# Patient Record
Sex: FEMALE | Race: WHITE | Employment: FULL TIME | URBAN - METROPOLITAN AREA
[De-identification: names, ages, dates, MRNs, and addresses within clinical notes are randomized per-mention and may not be internally consistent; named-entity substitution may affect disease eponyms.]

---

## 2018-05-25 ENCOUNTER — HOSPITAL ENCOUNTER (OUTPATIENT)
Dept: ULTRASOUND IMAGING | Age: 28
Discharge: HOME | End: 2018-05-25
Attending: NURSE PRACTITIONER
Payer: COMMERCIAL

## 2018-05-25 DIAGNOSIS — Z3A.09 9 WEEKS GESTATION OF PREGNANCY: ICD-10-CM

## 2018-05-25 PROCEDURE — 76801 OB US < 14 WKS SINGLE FETUS: CPT | Performed by: NURSE PRACTITIONER

## 2018-05-25 PROCEDURE — 76817 TRANSVAGINAL US OBSTETRIC: CPT | Performed by: NURSE PRACTITIONER

## 2018-05-29 ENCOUNTER — LAB ENCOUNTER (OUTPATIENT)
Dept: LAB | Age: 28
End: 2018-05-29
Attending: NURSE PRACTITIONER
Payer: COMMERCIAL

## 2018-05-29 DIAGNOSIS — Z34.00 PRENATAL CARE, FIRST PREGNANCY: Primary | ICD-10-CM

## 2018-05-29 PROCEDURE — 85025 COMPLETE CBC W/AUTO DIFF WBC: CPT

## 2018-05-29 PROCEDURE — 36415 COLL VENOUS BLD VENIPUNCTURE: CPT

## 2018-05-29 PROCEDURE — 86762 RUBELLA ANTIBODY: CPT

## 2018-05-29 PROCEDURE — 87340 HEPATITIS B SURFACE AG IA: CPT

## 2018-05-29 PROCEDURE — 86900 BLOOD TYPING SEROLOGIC ABO: CPT

## 2018-05-29 PROCEDURE — 86850 RBC ANTIBODY SCREEN: CPT

## 2018-05-29 PROCEDURE — 87389 HIV-1 AG W/HIV-1&-2 AB AG IA: CPT

## 2018-05-29 PROCEDURE — 86901 BLOOD TYPING SEROLOGIC RH(D): CPT

## 2018-05-29 PROCEDURE — 86780 TREPONEMA PALLIDUM: CPT

## 2018-10-22 PROBLEM — Z34.00 SUPERVISION OF NORMAL FIRST PREGNANCY: Status: ACTIVE | Noted: 2018-10-22

## 2018-11-30 ENCOUNTER — APPOINTMENT (OUTPATIENT)
Dept: LAB | Age: 28
End: 2018-11-30
Attending: OBSTETRICS & GYNECOLOGY
Payer: COMMERCIAL

## 2018-11-30 DIAGNOSIS — Z34.03 ENCOUNTER FOR SUPERVISION OF NORMAL FIRST PREGNANCY IN THIRD TRIMESTER: ICD-10-CM

## 2018-11-30 PROCEDURE — 36415 COLL VENOUS BLD VENIPUNCTURE: CPT

## 2018-11-30 PROCEDURE — 87389 HIV-1 AG W/HIV-1&-2 AB AG IA: CPT

## 2018-12-03 PROCEDURE — 87081 CULTURE SCREEN ONLY: CPT | Performed by: OBSTETRICS & GYNECOLOGY

## 2018-12-03 PROCEDURE — 87653 STREP B DNA AMP PROBE: CPT | Performed by: OBSTETRICS & GYNECOLOGY

## 2018-12-21 ENCOUNTER — HOSPITAL ENCOUNTER (INPATIENT)
Facility: HOSPITAL | Age: 28
LOS: 3 days | Discharge: HOME | End: 2018-12-24
Attending: OBSTETRICS & GYNECOLOGY | Admitting: OBSTETRICS & GYNECOLOGY
Payer: COMMERCIAL

## 2018-12-21 PROBLEM — Z34.90 NORMAL PREGNANCY: Status: ACTIVE | Noted: 2018-12-21

## 2018-12-21 PROCEDURE — 86900 BLOOD TYPING SEROLOGIC ABO: CPT | Performed by: OBSTETRICS & GYNECOLOGY

## 2018-12-21 PROCEDURE — 86901 BLOOD TYPING SEROLOGIC RH(D): CPT | Performed by: OBSTETRICS & GYNECOLOGY

## 2018-12-21 PROCEDURE — 86780 TREPONEMA PALLIDUM: CPT | Performed by: OBSTETRICS & GYNECOLOGY

## 2018-12-21 PROCEDURE — 81002 URINALYSIS NONAUTO W/O SCOPE: CPT

## 2018-12-21 PROCEDURE — 86850 RBC ANTIBODY SCREEN: CPT | Performed by: OBSTETRICS & GYNECOLOGY

## 2018-12-21 PROCEDURE — 85027 COMPLETE CBC AUTOMATED: CPT | Performed by: OBSTETRICS & GYNECOLOGY

## 2018-12-21 PROCEDURE — 99204 OFFICE O/P NEW MOD 45 MIN: CPT

## 2018-12-21 RX ORDER — DEXTROSE, SODIUM CHLORIDE, SODIUM LACTATE, POTASSIUM CHLORIDE, AND CALCIUM CHLORIDE 5; .6; .31; .03; .02 G/100ML; G/100ML; G/100ML; G/100ML; G/100ML
INJECTION, SOLUTION INTRAVENOUS AS NEEDED
Status: DISCONTINUED | OUTPATIENT
Start: 2018-12-21 | End: 2018-12-22 | Stop reason: HOSPADM

## 2018-12-21 RX ORDER — EPHEDRINE SULFATE/0.9% NACL/PF 25 MG/5 ML
5 SYRINGE (ML) INTRAVENOUS AS NEEDED
Status: DISCONTINUED | OUTPATIENT
Start: 2018-12-21 | End: 2018-12-22

## 2018-12-21 RX ORDER — SODIUM CHLORIDE, SODIUM LACTATE, POTASSIUM CHLORIDE, CALCIUM CHLORIDE 600; 310; 30; 20 MG/100ML; MG/100ML; MG/100ML; MG/100ML
INJECTION, SOLUTION INTRAVENOUS CONTINUOUS
Status: DISCONTINUED | OUTPATIENT
Start: 2018-12-21 | End: 2018-12-22 | Stop reason: HOSPADM

## 2018-12-21 RX ORDER — IBUPROFEN 600 MG/1
600 TABLET ORAL ONCE AS NEEDED
Status: DISCONTINUED | OUTPATIENT
Start: 2018-12-21 | End: 2018-12-22 | Stop reason: HOSPADM

## 2018-12-21 RX ORDER — AMMONIA INHALANTS 0.04 G/.3ML
0.3 INHALANT RESPIRATORY (INHALATION) AS NEEDED
Status: DISCONTINUED | OUTPATIENT
Start: 2018-12-21 | End: 2018-12-22 | Stop reason: HOSPADM

## 2018-12-21 RX ORDER — NALBUPHINE HCL 10 MG/ML
2.5 AMPUL (ML) INJECTION
Status: DISCONTINUED | OUTPATIENT
Start: 2018-12-21 | End: 2018-12-22

## 2018-12-21 RX ORDER — TERBUTALINE SULFATE 1 MG/ML
0.25 INJECTION, SOLUTION SUBCUTANEOUS AS NEEDED
Status: DISCONTINUED | OUTPATIENT
Start: 2018-12-21 | End: 2018-12-22 | Stop reason: HOSPADM

## 2018-12-21 RX ORDER — TRISODIUM CITRATE DIHYDRATE AND CITRIC ACID MONOHYDRATE 500; 334 MG/5ML; MG/5ML
30 SOLUTION ORAL AS NEEDED
Status: DISCONTINUED | OUTPATIENT
Start: 2018-12-21 | End: 2018-12-22 | Stop reason: HOSPADM

## 2018-12-21 NOTE — PROGRESS NOTES
Patient presents to Triage with concerns of labor stating her pain at 7 during contractions. Contractions started last nite at 8 pm and continued thru the nite she was able to sleep.  She decided to come to the hospital because contractions became closer to

## 2018-12-22 PROBLEM — Z37.9 NORMAL LABOR: Status: ACTIVE | Noted: 2018-12-22

## 2018-12-22 RX ORDER — ACETAMINOPHEN 325 MG/1
650 TABLET ORAL EVERY 6 HOURS PRN
Status: DISCONTINUED | OUTPATIENT
Start: 2018-12-22 | End: 2018-12-24

## 2018-12-22 RX ORDER — DOCUSATE SODIUM 100 MG/1
100 CAPSULE, LIQUID FILLED ORAL
Status: DISCONTINUED | OUTPATIENT
Start: 2018-12-22 | End: 2018-12-24

## 2018-12-22 RX ORDER — SIMETHICONE 80 MG
80 TABLET,CHEWABLE ORAL 3 TIMES DAILY PRN
Status: DISCONTINUED | OUTPATIENT
Start: 2018-12-22 | End: 2018-12-24

## 2018-12-22 RX ORDER — IBUPROFEN 600 MG/1
600 TABLET ORAL EVERY 6 HOURS
Status: DISCONTINUED | OUTPATIENT
Start: 2018-12-22 | End: 2018-12-24

## 2018-12-22 RX ORDER — ZOLPIDEM TARTRATE 5 MG/1
5 TABLET ORAL NIGHTLY PRN
Status: DISCONTINUED | OUTPATIENT
Start: 2018-12-22 | End: 2018-12-24

## 2018-12-22 RX ORDER — BISACODYL 10 MG
10 SUPPOSITORY, RECTAL RECTAL ONCE AS NEEDED
Status: ACTIVE | OUTPATIENT
Start: 2018-12-22 | End: 2018-12-22

## 2018-12-22 NOTE — PROGRESS NOTES
NURSING ADMISSION NOTE      Patient admitted via Wheelchair  Oriented to room. Safety precautions initiated. Bed in low position. Call light in reach. Report received from Ned Yung.

## 2018-12-22 NOTE — PROGRESS NOTES
Dr Farrell Covert called and informed of pts. SVE since walking for an hour. SVE 3/90/ -2 mid position . Feeling contractions more intense than admission. Large amount of bloody show noted with exam.  Evi every 3 mins. Palpate, mild.

## 2018-12-22 NOTE — H&P
Jiráschristin 1205 Patient Status:  Inpatient    1990 MRN [id number]   Location [address] [facility] Attending Dayna Lafleur MD   [facility] Day # 1 PCP Lucien Gonzalez, APRN     Subjective:  Evy Lawton discussed in detail. All questions answered, all appropriate consents will be signed at the Hospital. Admission is planned for today. Augmentation: IV Pitocin augmentation.

## 2018-12-22 NOTE — L&D DELIVERY NOTE
Vaginal Delivery Note          [address] [facility] Patient Status:  Inpatient    1990 MRN [id number]   Location 1818 [facility] Attending Eb Burr MD   [facility] Day # 1 PCP correct. Complications:  None    Mother and infant in good condition.

## 2018-12-23 PROCEDURE — 85025 COMPLETE CBC W/AUTO DIFF WBC: CPT | Performed by: OBSTETRICS & GYNECOLOGY

## 2018-12-23 NOTE — PROGRESS NOTES
[facility]  Post-Partum Progress Note    May End Patient Status:  Inpatient    1990 MRN [id number]   [facility] 2SW-J Attending Elliot Rocha MD   [facility] Day # 2 PCP Rosa More, APRN     SUBJECTIVE:    Postpartum

## 2018-12-24 VITALS
TEMPERATURE: 98 F | OXYGEN SATURATION: 90 % | BODY MASS INDEX: 28.79 KG/M2 | RESPIRATION RATE: 18 BRPM | HEART RATE: 66 BPM | WEIGHT: 190 LBS | SYSTOLIC BLOOD PRESSURE: 121 MMHG | HEIGHT: 68 IN | DIASTOLIC BLOOD PRESSURE: 66 MMHG

## 2018-12-24 PROBLEM — Z34.90 NORMAL PREGNANCY: Status: RESOLVED | Noted: 2018-12-21 | Resolved: 2018-12-24

## 2018-12-24 PROBLEM — Z37.9 NORMAL LABOR: Status: RESOLVED | Noted: 2018-12-22 | Resolved: 2018-12-24

## 2018-12-24 NOTE — DISCHARGE SUMMARY
[facility]  Discharge Summary    [address] [facility] Patient Status:  Inpatient    1990 MRN [id number]   [facility] 2SW-J Attending Naida Monae MD   [facility] Day # 3 PCP JORDEN Argueta         [facility]: Estimated Date of Delive

## 2018-12-24 NOTE — PROGRESS NOTES
[facility]  Post-Partum Progress Note    Christiano Mcclendon Patient Status:  Inpatient    1990 MRN [id number]   [facility] 2SW-J Attending Arturo Del Toro MD   [facility] Day # 3 PCP Aakash Roblero, APRN     SUBJECTIVE:    Postpartum

## 2018-12-27 ENCOUNTER — TELEPHONE (OUTPATIENT)
Dept: OBGYN UNIT | Facility: HOSPITAL | Age: 28
End: 2018-12-27

## 2018-12-28 ENCOUNTER — TELEPHONE (OUTPATIENT)
Dept: OBGYN UNIT | Facility: HOSPITAL | Age: 28
End: 2018-12-28

## 2018-12-28 NOTE — PROGRESS NOTES
Outgoing cradle call placed. No answer. Left general voicemail message #2 attempt/VM left, CC attempts are complete.  Will send [facility] letter

## 2019-01-03 ENCOUNTER — NURSE ONLY (OUTPATIENT)
Dept: LACTATION | Facility: HOSPITAL | Age: 29
End: 2019-01-03
Attending: OBSTETRICS & GYNECOLOGY
Payer: COMMERCIAL

## 2019-01-03 DIAGNOSIS — O92.29 SORE NIPPLES DUE TO LACTATION: Primary | ICD-10-CM

## 2019-01-03 PROCEDURE — 99213 OFFICE O/P EST LOW 20 MIN: CPT

## 2019-01-03 NOTE — PROGRESS NOTES
LACTATION NOTE - MOTHER      Evaluation Type: Outpatient Initial    Problems identified  Problems identified: Knowledge deficit; Nipple trauma; Nipple pain;Milk supply WNL    Maternal history  Other/comment: (States that her nipples have scabbed and bled, ha

## 2019-02-01 PROCEDURE — 87624 HPV HI-RISK TYP POOLED RSLT: CPT | Performed by: OBSTETRICS & GYNECOLOGY

## 2019-02-01 PROCEDURE — 88175 CYTOPATH C/V AUTO FLUID REDO: CPT | Performed by: OBSTETRICS & GYNECOLOGY

## 2020-07-21 ENCOUNTER — TELEPHONE (OUTPATIENT)
Dept: OBGYN CLINIC | Facility: CLINIC | Age: 30
End: 2020-07-21

## 2020-08-11 ENCOUNTER — OFFICE VISIT (OUTPATIENT)
Dept: OBGYN CLINIC | Facility: CLINIC | Age: 30
End: 2020-08-11
Payer: COMMERCIAL

## 2020-08-11 ENCOUNTER — ULTRASOUND ENCOUNTER (OUTPATIENT)
Dept: OBGYN CLINIC | Facility: CLINIC | Age: 30
End: 2020-08-11
Payer: COMMERCIAL

## 2020-08-11 VITALS
DIASTOLIC BLOOD PRESSURE: 68 MMHG | SYSTOLIC BLOOD PRESSURE: 116 MMHG | HEIGHT: 67 IN | BODY MASS INDEX: 27.81 KG/M2 | HEART RATE: 65 BPM | WEIGHT: 177.19 LBS

## 2020-08-11 DIAGNOSIS — N91.1 SECONDARY AMENORRHEA: Primary | ICD-10-CM

## 2020-08-11 DIAGNOSIS — Z32.01 PREGNANCY EXAMINATION OR TEST, POSITIVE RESULT: ICD-10-CM

## 2020-08-11 PROCEDURE — 3074F SYST BP LT 130 MM HG: CPT | Performed by: OBSTETRICS & GYNECOLOGY

## 2020-08-11 PROCEDURE — 3078F DIAST BP <80 MM HG: CPT | Performed by: OBSTETRICS & GYNECOLOGY

## 2020-08-11 PROCEDURE — 3008F BODY MASS INDEX DOCD: CPT | Performed by: OBSTETRICS & GYNECOLOGY

## 2020-08-11 PROCEDURE — 76856 US EXAM PELVIC COMPLETE: CPT | Performed by: OBSTETRICS & GYNECOLOGY

## 2020-08-11 PROCEDURE — 99203 OFFICE O/P NEW LOW 30 MIN: CPT | Performed by: OBSTETRICS & GYNECOLOGY

## 2020-08-11 NOTE — PATIENT INSTRUCTIONS
Medications Safe in Pregnancy  The following over-the-counter medications may be taken safely after 12 weeks gestation by any patient who is pregnant. Please follow the instructions on the package for adult dosage.   If you experience any symptoms prior to screening:  - Performed at 13 weeks and includes blood test and ultrasound  - Screens for Trisomy 13, 18 and 21 (Down Syndrome)   - You will need to schedule an appointment with the Maternal Fetal Medicine office  - Therefore, you will need time to make ap code: [id number]  Diagnosis code: Cystic fibrosis screening - Z13.228     -------------------------------------------------

## 2020-08-11 NOTE — PROGRESS NOTES
Confirmation of pregnancy  Trans abdominal  lmp 06/09/2020, ega 9weeks 0days edc 03/16/2021    Jules viable iup at 8w 4d  Nl cervix, yolk sac, adnexa    EGA 9W 0D WITH [facility] 03/16/2021

## 2020-08-11 NOTE — PROGRESS NOTES
[address] [facility]  Obstetrics and Gynecology    Subjective:     May Kaur is a 26year old  female presents with c/o secondary amenorrhea and positive pregnancy test. The patient was recommended to return for further evaluation.  The pa notes understanding and agrees with the plan of care. All questions were answered to the best of my ability at this time.     RTC in 4 weeks or sooner if needed     Yesenia Blackwood MD

## 2020-09-14 ENCOUNTER — INITIAL PRENATAL (OUTPATIENT)
Dept: OBGYN CLINIC | Facility: CLINIC | Age: 30
End: 2020-09-14
Payer: COMMERCIAL

## 2020-09-14 VITALS
BODY MASS INDEX: 27.78 KG/M2 | DIASTOLIC BLOOD PRESSURE: 70 MMHG | WEIGHT: 177 LBS | HEIGHT: 67 IN | SYSTOLIC BLOOD PRESSURE: 114 MMHG

## 2020-09-14 DIAGNOSIS — Z34.81 ENCOUNTER FOR SUPERVISION OF OTHER NORMAL PREGNANCY IN FIRST TRIMESTER: ICD-10-CM

## 2020-09-14 DIAGNOSIS — Z36.9 PRENATAL SCREENING ENCOUNTER: Primary | ICD-10-CM

## 2020-09-14 LAB
GLUCOSE (URINE DIPSTICK): NEGATIVE MG/DL
MULTISTIX LOT#: NORMAL NUMERIC

## 2020-09-14 PROCEDURE — 3078F DIAST BP <80 MM HG: CPT | Performed by: NURSE PRACTITIONER

## 2020-09-14 PROCEDURE — 87086 URINE CULTURE/COLONY COUNT: CPT | Performed by: NURSE PRACTITIONER

## 2020-09-14 PROCEDURE — 3008F BODY MASS INDEX DOCD: CPT | Performed by: NURSE PRACTITIONER

## 2020-09-14 PROCEDURE — 3074F SYST BP LT 130 MM HG: CPT | Performed by: NURSE PRACTITIONER

## 2020-09-14 PROCEDURE — 81002 URINALYSIS NONAUTO W/O SCOPE: CPT | Performed by: NURSE PRACTITIONER

## 2020-09-14 NOTE — PROGRESS NOTES
Here for initial prenatal visit with our group. 26year old  at 13w6d by LMP. Patient's last menstrual period was 2020 (exact date).   Last pap smear was 2019 and it was normal.      OB History    Para Term  AB Living   2 1

## 2020-10-12 ENCOUNTER — ROUTINE PRENATAL (OUTPATIENT)
Dept: OBGYN CLINIC | Facility: CLINIC | Age: 30
End: 2020-10-12
Payer: COMMERCIAL

## 2020-10-12 VITALS
HEIGHT: 67 IN | BODY MASS INDEX: 27.84 KG/M2 | DIASTOLIC BLOOD PRESSURE: 64 MMHG | SYSTOLIC BLOOD PRESSURE: 110 MMHG | WEIGHT: 177.38 LBS

## 2020-10-12 DIAGNOSIS — Z36.9 PRENATAL SCREENING ENCOUNTER: ICD-10-CM

## 2020-10-12 DIAGNOSIS — Z23 NEED FOR VACCINATION: ICD-10-CM

## 2020-10-12 DIAGNOSIS — Z34.80 ENCOUNTER FOR SUPERVISION OF NORMAL INTRAUTERINE PREGNANCY IN MULTIGRAVIDA, ANTEPARTUM: Primary | ICD-10-CM

## 2020-10-12 PROCEDURE — 85025 COMPLETE CBC W/AUTO DIFF WBC: CPT | Performed by: NURSE PRACTITIONER

## 2020-10-12 PROCEDURE — 90471 IMMUNIZATION ADMIN: CPT | Performed by: OBSTETRICS & GYNECOLOGY

## 2020-10-12 PROCEDURE — 90686 IIV4 VACC NO PRSV 0.5 ML IM: CPT | Performed by: OBSTETRICS & GYNECOLOGY

## 2020-10-12 PROCEDURE — 3008F BODY MASS INDEX DOCD: CPT | Performed by: OBSTETRICS & GYNECOLOGY

## 2020-10-12 PROCEDURE — 87340 HEPATITIS B SURFACE AG IA: CPT | Performed by: NURSE PRACTITIONER

## 2020-10-12 PROCEDURE — 86850 RBC ANTIBODY SCREEN: CPT | Performed by: NURSE PRACTITIONER

## 2020-10-12 PROCEDURE — 87389 HIV-1 AG W/HIV-1&-2 AB AG IA: CPT | Performed by: NURSE PRACTITIONER

## 2020-10-12 PROCEDURE — 86900 BLOOD TYPING SEROLOGIC ABO: CPT | Performed by: NURSE PRACTITIONER

## 2020-10-12 PROCEDURE — 86762 RUBELLA ANTIBODY: CPT | Performed by: NURSE PRACTITIONER

## 2020-10-12 PROCEDURE — 3078F DIAST BP <80 MM HG: CPT | Performed by: OBSTETRICS & GYNECOLOGY

## 2020-10-12 PROCEDURE — 86780 TREPONEMA PALLIDUM: CPT | Performed by: NURSE PRACTITIONER

## 2020-10-12 PROCEDURE — 81002 URINALYSIS NONAUTO W/O SCOPE: CPT | Performed by: OBSTETRICS & GYNECOLOGY

## 2020-10-12 PROCEDURE — 3074F SYST BP LT 130 MM HG: CPT | Performed by: OBSTETRICS & GYNECOLOGY

## 2020-10-12 PROCEDURE — 86901 BLOOD TYPING SEROLOGIC RH(D): CPT | Performed by: NURSE PRACTITIONER

## 2020-10-12 RX ORDER — PYRIDOXINE HCL (VITAMIN B6) 50 MG
TABLET ORAL
Status: ON HOLD | COMMUNITY
End: 2021-03-20

## 2020-10-12 NOTE — PROGRESS NOTES
ALBERT.   Doing well. No complaints. Denies abdominal/pelvic pain or vaginal bleeding.    /64   Ht 67\"   Wt 177 lb 6.4 oz (80.5 kg)   LMP 06/09/2020 (Exact Date)   BMI 27.78 kg/m²   Rh +   Recommend Anatomy scan 20 wks   Prenatal labs reviewed     RTC

## 2020-10-28 ENCOUNTER — ULTRASOUND ENCOUNTER (OUTPATIENT)
Dept: OBGYN CLINIC | Facility: CLINIC | Age: 30
End: 2020-10-28
Payer: COMMERCIAL

## 2020-10-28 ENCOUNTER — ROUTINE PRENATAL (OUTPATIENT)
Dept: OBGYN CLINIC | Facility: CLINIC | Age: 30
End: 2020-10-28
Payer: COMMERCIAL

## 2020-10-28 VITALS — DIASTOLIC BLOOD PRESSURE: 74 MMHG | WEIGHT: 177 LBS | SYSTOLIC BLOOD PRESSURE: 120 MMHG | BODY MASS INDEX: 28 KG/M2

## 2020-10-28 DIAGNOSIS — Z36.89 ENCOUNTER FOR ROUTINE FETAL ULTRASOUND: ICD-10-CM

## 2020-10-28 DIAGNOSIS — Z34.80 ENCOUNTER FOR SUPERVISION OF NORMAL INTRAUTERINE PREGNANCY IN MULTIGRAVIDA, ANTEPARTUM: Primary | ICD-10-CM

## 2020-10-28 PROCEDURE — 76805 OB US >/= 14 WKS SNGL FETUS: CPT | Performed by: OBSTETRICS & GYNECOLOGY

## 2020-10-28 PROCEDURE — 81002 URINALYSIS NONAUTO W/O SCOPE: CPT | Performed by: NURSE PRACTITIONER

## 2020-10-28 PROCEDURE — 3078F DIAST BP <80 MM HG: CPT | Performed by: OBSTETRICS & GYNECOLOGY

## 2020-10-28 PROCEDURE — 3074F SYST BP LT 130 MM HG: CPT | Performed by: OBSTETRICS & GYNECOLOGY

## 2020-10-28 NOTE — PROGRESS NOTES
ALBERT - 20w1d  Doing well  No complaints.  Denies LOF/VB/uctx  /74   Wt 177 lb (80.3 kg)   LMP 06/09/2020 (Exact Date)   BMI 27.72 kg/m²   RH +     Anatomy Scan noted to have low lying placenta (1.5 cm from os) and suboptimal RVOT view - will repeat in

## 2020-11-16 ENCOUNTER — TELEPHONE (OUTPATIENT)
Dept: OBGYN CLINIC | Facility: CLINIC | Age: 30
End: 2020-11-16

## 2020-11-16 NOTE — TELEPHONE ENCOUNTER
22w6d; next appt 11/24/20 for repeat US and finn. Low lying placenta. Pt reports low abd/pelvic intermittent mild cramping for the past 2 days. Pt also reports low back pain radiating down right gluteal, constant for one week.   Pt had prenatal massage wh oral

## 2020-11-16 NOTE — TELEPHONE ENCOUNTER
If her pain is relieved with rest and hydration it is likely ligament pain and normal pregnancy pain from the pressure.  I would recommend she try a maternity belt and increase her hydration in general- try to quantify how much water she intakes each day an

## 2020-11-24 ENCOUNTER — ULTRASOUND ENCOUNTER (OUTPATIENT)
Dept: OBGYN CLINIC | Facility: CLINIC | Age: 30
End: 2020-11-24
Payer: COMMERCIAL

## 2020-11-24 ENCOUNTER — ROUTINE PRENATAL (OUTPATIENT)
Dept: OBGYN CLINIC | Facility: CLINIC | Age: 30
End: 2020-11-24
Payer: COMMERCIAL

## 2020-11-24 VITALS — SYSTOLIC BLOOD PRESSURE: 116 MMHG | DIASTOLIC BLOOD PRESSURE: 70 MMHG | BODY MASS INDEX: 29 KG/M2 | WEIGHT: 183 LBS

## 2020-11-24 DIAGNOSIS — O44.42 LOW-LYING PLACENTA IN SECOND TRIMESTER: ICD-10-CM

## 2020-11-24 DIAGNOSIS — Z36.9 PRENATAL SCREENING ENCOUNTER: Primary | ICD-10-CM

## 2020-11-24 DIAGNOSIS — Z36.89 SCREENING, ANTENATAL, FOR FETAL ANATOMIC SURVEY: ICD-10-CM

## 2020-11-24 PROCEDURE — 3074F SYST BP LT 130 MM HG: CPT | Performed by: OBSTETRICS & GYNECOLOGY

## 2020-11-24 PROCEDURE — 3078F DIAST BP <80 MM HG: CPT | Performed by: OBSTETRICS & GYNECOLOGY

## 2020-11-24 PROCEDURE — 76815 OB US LIMITED FETUS(S): CPT | Performed by: OBSTETRICS & GYNECOLOGY

## 2020-11-24 PROCEDURE — 81002 URINALYSIS NONAUTO W/O SCOPE: CPT | Performed by: OBSTETRICS & GYNECOLOGY

## 2020-11-24 NOTE — PROGRESS NOTES
Patient presents with:  Pregnancy: ALBERT  after US     Routine prenatal visit. Patient complains of pain in right buttock and low back, worse with movement. Patient has been getting massages to help.   Good fetal movement  Patient denies any bleeding, leaki

## 2020-12-04 ENCOUNTER — LAB ENCOUNTER (OUTPATIENT)
Dept: LAB | Age: 30
End: 2020-12-04
Attending: OBSTETRICS & GYNECOLOGY
Payer: COMMERCIAL

## 2020-12-04 DIAGNOSIS — Z34.80 ENCOUNTER FOR SUPERVISION OF NORMAL INTRAUTERINE PREGNANCY IN MULTIGRAVIDA, ANTEPARTUM: ICD-10-CM

## 2020-12-04 PROCEDURE — 85025 COMPLETE CBC W/AUTO DIFF WBC: CPT

## 2020-12-04 PROCEDURE — 36415 COLL VENOUS BLD VENIPUNCTURE: CPT

## 2020-12-04 PROCEDURE — 82950 GLUCOSE TEST: CPT

## 2020-12-22 ENCOUNTER — ROUTINE PRENATAL (OUTPATIENT)
Dept: OBGYN CLINIC | Facility: CLINIC | Age: 30
End: 2020-12-22
Payer: COMMERCIAL

## 2020-12-22 ENCOUNTER — ULTRASOUND ENCOUNTER (OUTPATIENT)
Dept: OBGYN CLINIC | Facility: CLINIC | Age: 30
End: 2020-12-22
Payer: COMMERCIAL

## 2020-12-22 VITALS — DIASTOLIC BLOOD PRESSURE: 70 MMHG | BODY MASS INDEX: 29 KG/M2 | SYSTOLIC BLOOD PRESSURE: 114 MMHG | WEIGHT: 186 LBS

## 2020-12-22 DIAGNOSIS — O44.42 LOW-LYING PLACENTA IN SECOND TRIMESTER: ICD-10-CM

## 2020-12-22 DIAGNOSIS — Z34.80 SUPERVISION OF OTHER NORMAL PREGNANCY: Primary | ICD-10-CM

## 2020-12-22 DIAGNOSIS — Z23 NEED FOR VACCINATION: ICD-10-CM

## 2020-12-22 PROCEDURE — 90715 TDAP VACCINE 7 YRS/> IM: CPT | Performed by: OBSTETRICS & GYNECOLOGY

## 2020-12-22 PROCEDURE — 3074F SYST BP LT 130 MM HG: CPT | Performed by: OBSTETRICS & GYNECOLOGY

## 2020-12-22 PROCEDURE — 90471 IMMUNIZATION ADMIN: CPT | Performed by: OBSTETRICS & GYNECOLOGY

## 2020-12-22 PROCEDURE — 76816 OB US FOLLOW-UP PER FETUS: CPT | Performed by: OBSTETRICS & GYNECOLOGY

## 2020-12-22 PROCEDURE — 81002 URINALYSIS NONAUTO W/O SCOPE: CPT | Performed by: OBSTETRICS & GYNECOLOGY

## 2020-12-22 PROCEDURE — 3078F DIAST BP <80 MM HG: CPT | Performed by: OBSTETRICS & GYNECOLOGY

## 2020-12-22 NOTE — PATIENT INSTRUCTIONS
Tdap Vaccine: What You Need To Know    1. Why Get Vaccinated:    · Tetanus, diphtheria, and pertussis can be very serious diseases, even for adolescents and adults. Tdap vaccine can protect us from these diseases.     · Tetanus (Lockjaw) causes painful mu tetanus infection. Medications Safe in Pregnancy  The following over-the-counter medications may be taken safely after 12 weeks gestation by any patient who is pregnant. Please follow the instructions on the package for adult dosage.   If you expe

## 2020-12-22 NOTE — PROGRESS NOTES
ALBERT    GA: 28w0d   20  1238   BP: 114/70   Weight: 186 lb (84.4 kg)       Doing well, +FM  Denies LOF/VB/uctx  Rh positive, TDAP received, EPDS 0  Fetal movement count given  Low lying placenta at 24 week US, repeat at 28 weeks noted for 2.0 c

## 2021-01-06 ENCOUNTER — ROUTINE PRENATAL (OUTPATIENT)
Dept: OBGYN CLINIC | Facility: CLINIC | Age: 31
End: 2021-01-06
Payer: COMMERCIAL

## 2021-01-06 VITALS — SYSTOLIC BLOOD PRESSURE: 108 MMHG | DIASTOLIC BLOOD PRESSURE: 66 MMHG | WEIGHT: 185.19 LBS | BODY MASS INDEX: 29 KG/M2

## 2021-01-06 DIAGNOSIS — Z34.80 SUPERVISION OF OTHER NORMAL PREGNANCY: Primary | ICD-10-CM

## 2021-01-06 DIAGNOSIS — O44.42 LOW-LYING PLACENTA IN SECOND TRIMESTER: ICD-10-CM

## 2021-01-06 PROCEDURE — 3074F SYST BP LT 130 MM HG: CPT | Performed by: NURSE PRACTITIONER

## 2021-01-06 PROCEDURE — 3078F DIAST BP <80 MM HG: CPT | Performed by: NURSE PRACTITIONER

## 2021-01-06 NOTE — PROGRESS NOTES
ALBERT  Doing well,  GOOD FM  Denies VB/LOF/uctx  RTC in 2 wks with US to evaluate placenta  Fetal movement instructions given

## 2021-01-22 ENCOUNTER — ULTRASOUND ENCOUNTER (OUTPATIENT)
Dept: OBGYN CLINIC | Facility: CLINIC | Age: 31
End: 2021-01-22
Payer: COMMERCIAL

## 2021-01-22 ENCOUNTER — ROUTINE PRENATAL (OUTPATIENT)
Dept: OBGYN CLINIC | Facility: CLINIC | Age: 31
End: 2021-01-22
Payer: COMMERCIAL

## 2021-01-22 ENCOUNTER — TELEPHONE (OUTPATIENT)
Dept: OBGYN CLINIC | Facility: CLINIC | Age: 31
End: 2021-01-22

## 2021-01-22 VITALS — SYSTOLIC BLOOD PRESSURE: 102 MMHG | DIASTOLIC BLOOD PRESSURE: 70 MMHG | WEIGHT: 187.38 LBS | BODY MASS INDEX: 29 KG/M2

## 2021-01-22 DIAGNOSIS — Z34.00 SUPERVISION OF NORMAL FIRST PREGNANCY, ANTEPARTUM: Primary | ICD-10-CM

## 2021-01-22 DIAGNOSIS — O44.42 LOW-LYING PLACENTA IN SECOND TRIMESTER: ICD-10-CM

## 2021-01-22 LAB — MULTISTIX LOT#: 5073 NUMERIC

## 2021-01-22 PROCEDURE — 76816 OB US FOLLOW-UP PER FETUS: CPT | Performed by: OBSTETRICS & GYNECOLOGY

## 2021-01-22 PROCEDURE — 3074F SYST BP LT 130 MM HG: CPT | Performed by: OBSTETRICS & GYNECOLOGY

## 2021-01-22 PROCEDURE — 3078F DIAST BP <80 MM HG: CPT | Performed by: OBSTETRICS & GYNECOLOGY

## 2021-01-22 PROCEDURE — 81002 URINALYSIS NONAUTO W/O SCOPE: CPT | Performed by: OBSTETRICS & GYNECOLOGY

## 2021-01-22 NOTE — PATIENT INSTRUCTIONS
FETAL MOVEMENT CHART    Although not all women need to perform daily fetal movement counts, if you notice a decrease in fetal activity, you should contact our office immediately. Begin counting fetal movements at 32 weeks of pregnancy.   You may fi

## 2021-01-22 NOTE — PROGRESS NOTES
ALBERT    GA: 32w3d   21  1207   BP: 102/70   Weight: 187 lb 6.4 oz (85 kg)       Doing well, +FM  Denies LOF/VB/uctx. Occasional lower abdominal pressure.    Rh positive, TDAP received, EPDS 0  Fetal movement count given  Low lying placenta at 24

## 2021-01-22 NOTE — TELEPHONE ENCOUNTER
Pt dropped off [facility] paperwork to be completed. Form fee paid.     Placed in nursing bin in [facility]    Call pt when ready

## 2021-01-26 ENCOUNTER — ROUTINE PRENATAL (OUTPATIENT)
Dept: OBGYN CLINIC | Facility: CLINIC | Age: 31
End: 2021-01-26
Payer: COMMERCIAL

## 2021-01-26 ENCOUNTER — TELEPHONE (OUTPATIENT)
Dept: OBGYN CLINIC | Facility: CLINIC | Age: 31
End: 2021-01-26

## 2021-01-26 VITALS — DIASTOLIC BLOOD PRESSURE: 76 MMHG | SYSTOLIC BLOOD PRESSURE: 124 MMHG | BODY MASS INDEX: 30 KG/M2 | WEIGHT: 189.38 LBS

## 2021-01-26 DIAGNOSIS — O26.893 PELVIC PRESSURE IN PREGNANCY, ANTEPARTUM, THIRD TRIMESTER: ICD-10-CM

## 2021-01-26 DIAGNOSIS — Z34.83 PRENATAL CARE, SUBSEQUENT PREGNANCY, THIRD TRIMESTER: Primary | ICD-10-CM

## 2021-01-26 DIAGNOSIS — R10.2 PELVIC PRESSURE IN PREGNANCY, ANTEPARTUM, THIRD TRIMESTER: ICD-10-CM

## 2021-01-26 DIAGNOSIS — O99.891 BACK PAIN AFFECTING PREGNANCY IN THIRD TRIMESTER: ICD-10-CM

## 2021-01-26 DIAGNOSIS — M54.9 BACK PAIN AFFECTING PREGNANCY IN THIRD TRIMESTER: ICD-10-CM

## 2021-01-26 LAB — MULTISTIX LOT#: 5073 NUMERIC

## 2021-01-26 PROCEDURE — 81002 URINALYSIS NONAUTO W/O SCOPE: CPT | Performed by: NURSE PRACTITIONER

## 2021-01-26 PROCEDURE — 3078F DIAST BP <80 MM HG: CPT | Performed by: NURSE PRACTITIONER

## 2021-01-26 PROCEDURE — 3074F SYST BP LT 130 MM HG: CPT | Performed by: NURSE PRACTITIONER

## 2021-01-26 NOTE — TELEPHONE ENCOUNTER
Pt calling states that she is really uncomfortable to walk, vaginal cramping feels uterus area is very painful. Today she noticed rectal bleeding.      She would like to speak with nurse, almost wanting to be referred to see a physical therapist as the exer

## 2021-01-26 NOTE — TELEPHONE ENCOUNTER
33w0d; last seen 1/22/21. Pt reports ongoing pelvic, vaginal and low back pain. Pt states this pain is constant but worse with movement. Pt states pain has been going on for 2 months, but worse over the past week.   Pt has tried heating pad and stretch

## 2021-01-26 NOTE — PROGRESS NOTES
ALBERT  C/O worsening low back pain, sciatica, and low pelvic pressure  She has been stretching and using heat. She also tried pregnancy support clothing and a band with minimal to no improvement.   Will refer to PT  She has had discharge the whole pregnancy,

## 2021-01-27 ENCOUNTER — TELEPHONE (OUTPATIENT)
Dept: PHYSICAL THERAPY | Facility: HOSPITAL | Age: 31
End: 2021-01-27

## 2021-01-27 ENCOUNTER — OFFICE VISIT (OUTPATIENT)
Dept: PHYSICAL THERAPY | Age: 31
End: 2021-01-27
Attending: NURSE PRACTITIONER
Payer: COMMERCIAL

## 2021-01-27 DIAGNOSIS — O26.893 PELVIC PRESSURE IN PREGNANCY, ANTEPARTUM, THIRD TRIMESTER: ICD-10-CM

## 2021-01-27 DIAGNOSIS — R10.2 PELVIC PRESSURE IN PREGNANCY, ANTEPARTUM, THIRD TRIMESTER: ICD-10-CM

## 2021-01-27 DIAGNOSIS — M54.9 BACK PAIN AFFECTING PREGNANCY IN THIRD TRIMESTER: ICD-10-CM

## 2021-01-27 DIAGNOSIS — O99.891 BACK PAIN AFFECTING PREGNANCY IN THIRD TRIMESTER: ICD-10-CM

## 2021-01-27 PROCEDURE — 97112 NEUROMUSCULAR REEDUCATION: CPT

## 2021-01-27 PROCEDURE — 97535 SELF CARE MNGMENT TRAINING: CPT

## 2021-01-27 PROCEDURE — 97140 MANUAL THERAPY 1/> REGIONS: CPT

## 2021-01-27 PROCEDURE — 97163 PT EVAL HIGH COMPLEX 45 MIN: CPT

## 2021-01-28 ENCOUNTER — MED REC SCAN ONLY (OUTPATIENT)
Dept: OBGYN CLINIC | Facility: CLINIC | Age: 31
End: 2021-01-28

## 2021-01-28 NOTE — PROGRESS NOTES
MUSCULOSKELETAL AND PELVIC FLOOR EVALUATION:     Referring Physician: Dr. Barbara Gonzalez  Diagnosis: Back pain affecting pregnancy in third trimester (O99.891,M54.9)  Pelvic pressure in pregnancy, antepartum, third trimester (O26.893,N94.9)     Date of Service: 1 YES  Urinary Frequency: 2 hours  Episodes of Leakage: 2 times per day  Pad Change frequency: Liner for urinary 2-3x/ day and Tucks Pads  Nocturia: 1      BOWEL HABITS  Types of symptoms: Constipation   Frequency of bowel movements: Every 2 days  Stool cons discussed evaluation findings, pathology, POC and HEP. Pt voiced understanding and performs HEP correctly without reported pain. Skilled Pelvic Physical Therapy is medically necessary to address the above impairments and reach functional goals.     OBJECTIV Goals: (to be met in 12 visits)    STG 4-6 visits  Patient instructed on Constipation education. Patient instructed on use of step stool to allow for defecation without straining.     Patient instructed on diaphragmatic breathing for parasympathetic n health, neuroscience principles for pelvic floor rehab, bladder retraining, posture and body mechanics, Functional integration of pelvic floor muscle exercises, Modalities as needed (including ultrasound and e-stimulation), HEP instruction and progression

## 2021-01-28 NOTE — PATIENT INSTRUCTIONS
THE PELVIC BRACE    The pelvic brace is a combined pelvic floor and transverse abdominal low intensity muscle contraction.  The transverse abdominal muscle is the deepest layer of the abdominal muscles and it aids in supporting the pelvic organs, spine ? Place your fingers on your lower abdomen just inside your pelvic bones. You should feel the low level contraction under your fingertips. ?  Contract the pelvic floor creating tension in the surface layer muscles that moves up to you abdomen and stops at ______________________________________________________________________    ______________________________________________________________________      © 2004, [employer]  44 inches SI Belt                ABOUT CONSTIPATION    WHAT IS CON Yes, constipation can be caused by medications you are taking for other conditions.  Common medications include; pain medicines, antidepressants, psychiatric medications, high blood pressure medication, diuretics, iron supplements, calcium supplements, farmer Your body has a natural emptying reflex. Approximately ½ hour after eating a meal or drinking a hot beverage, a reflex occurs to increase motility or movement of the stool down to the rectum.  This reflex usually occurs in the mornings when trying to get yo To maintain proper bowel function high fiber foods, such as bran, shredded wheat, whole grain breads, whole fresh fruits especially those with skins such as apples, raw vegetables, are important in your diet.  Fiber helps general bowel health whether you lo Wheat Bran 1 oz. 10.0   All Bran ½ cup 6.0   Optimum 1 cup 10.0   Whole Wheat Total 1 cup 3.0   Fiber One  ½ cup 13.0   Shredded Wheat 1oz. 2.64   Corn Flakes 1 oz. 0.45   Cheerio’s 11/3 cup 2.0   Oatmeal 1 oz.  2.5   Rice     Brown ½ cup 5.27   White ½ cup

## 2021-01-29 NOTE — TELEPHONE ENCOUNTER
Signed forms received    Copy to scan  [facility]    Patient notified via VM. Copy to desk in [address] for .  Note to appt

## 2021-02-03 ENCOUNTER — OFFICE VISIT (OUTPATIENT)
Dept: PHYSICAL THERAPY | Age: 31
End: 2021-02-03
Attending: NURSE PRACTITIONER
Payer: COMMERCIAL

## 2021-02-03 PROCEDURE — 97110 THERAPEUTIC EXERCISES: CPT

## 2021-02-03 PROCEDURE — 97112 NEUROMUSCULAR REEDUCATION: CPT

## 2021-02-03 NOTE — PROGRESS NOTES
Dx: Back pain affecting pregnancy in third trimester (O99.891,M54.9)  Pelvic pressure in pregnancy, antepartum, third trimester (O26.893,N94.9)             Insurance (Authorized # of Visits):  2/12           Authorizing Physician: Dr. Roman Dates  Next MD visit:    Functional deficits include but are not limited to abdominal pain, vaginal pain, pressure, constipation and leakage.  Signs and symptoms are consistent with diagnosis of Back pain affecting pregnancy in third trimester (O99.891,M54.9); and Pelvic pres 1-2 x/week or a total of 12 visits over a 90 day period. Treatment will include: Neuromuscular re-education, including use of biofeedback to aid in pelvic floor muscle retraining (down training, up training, isolation, and coordination);  Manual therapy: s

## 2021-02-04 ENCOUNTER — ROUTINE PRENATAL (OUTPATIENT)
Dept: OBGYN CLINIC | Facility: CLINIC | Age: 31
End: 2021-02-04
Payer: COMMERCIAL

## 2021-02-04 VITALS — WEIGHT: 192.19 LBS | DIASTOLIC BLOOD PRESSURE: 70 MMHG | BODY MASS INDEX: 30 KG/M2 | SYSTOLIC BLOOD PRESSURE: 104 MMHG

## 2021-02-04 DIAGNOSIS — Z34.00 SUPERVISION OF NORMAL FIRST PREGNANCY, ANTEPARTUM: Primary | ICD-10-CM

## 2021-02-04 DIAGNOSIS — Z34.83 PRENATAL CARE, SUBSEQUENT PREGNANCY, THIRD TRIMESTER: ICD-10-CM

## 2021-02-04 LAB — MULTISTIX LOT#: 6038 NUMERIC

## 2021-02-04 PROCEDURE — 81002 URINALYSIS NONAUTO W/O SCOPE: CPT | Performed by: NURSE PRACTITIONER

## 2021-02-04 PROCEDURE — 3074F SYST BP LT 130 MM HG: CPT | Performed by: NURSE PRACTITIONER

## 2021-02-04 PROCEDURE — 3078F DIAST BP <80 MM HG: CPT | Performed by: NURSE PRACTITIONER

## 2021-02-08 ENCOUNTER — APPOINTMENT (OUTPATIENT)
Dept: PHYSICAL THERAPY | Age: 31
End: 2021-02-08
Attending: NURSE PRACTITIONER
Payer: COMMERCIAL

## 2021-02-08 ENCOUNTER — TELEPHONE (OUTPATIENT)
Dept: PHYSICAL THERAPY | Facility: HOSPITAL | Age: 31
End: 2021-02-08

## 2021-02-08 ENCOUNTER — MOBILE ENCOUNTER (OUTPATIENT)
Dept: OBGYN CLINIC | Facility: CLINIC | Age: 31
End: 2021-02-08

## 2021-02-08 ENCOUNTER — HOSPITAL ENCOUNTER (OUTPATIENT)
Facility: HOSPITAL | Age: 31
Setting detail: OBSERVATION
Discharge: HOME | End: 2021-02-08
Attending: OBSTETRICS & GYNECOLOGY | Admitting: OBSTETRICS & GYNECOLOGY
Payer: COMMERCIAL

## 2021-02-08 VITALS
HEIGHT: 67 IN | HEART RATE: 103 BPM | TEMPERATURE: 98 F | BODY MASS INDEX: 30.13 KG/M2 | DIASTOLIC BLOOD PRESSURE: 75 MMHG | WEIGHT: 192 LBS | SYSTOLIC BLOOD PRESSURE: 122 MMHG

## 2021-02-08 PROBLEM — Z34.90 NORMAL PREGNANCY: Status: ACTIVE | Noted: 2021-02-08

## 2021-02-08 LAB — HIV 1+2 AB+HIV1 P24 AG SERPL QL IA: NONREACTIVE

## 2021-02-08 PROCEDURE — 99234 HOSP IP/OBS SM DT SF/LOW 45: CPT | Performed by: OBSTETRICS & GYNECOLOGY

## 2021-02-08 RX ORDER — SODIUM CHLORIDE, SODIUM LACTATE, POTASSIUM CHLORIDE, CALCIUM CHLORIDE 600; 310; 30; 20 MG/100ML; MG/100ML; MG/100ML; MG/100ML
INJECTION, SOLUTION INTRAVENOUS CONTINUOUS
Status: DISCONTINUED | OUTPATIENT
Start: 2021-02-08 | End: 2021-02-08

## 2021-02-08 RX ORDER — TERBUTALINE SULFATE 1 MG/ML
0.25 INJECTION, SOLUTION SUBCUTANEOUS
Status: ACTIVE | OUTPATIENT
Start: 2021-02-08 | End: 2021-02-08

## 2021-02-08 RX ORDER — TERBUTALINE SULFATE 1 MG/ML
INJECTION, SOLUTION SUBCUTANEOUS
Status: COMPLETED
Start: 2021-02-08 | End: 2021-02-08

## 2021-02-08 NOTE — PROGRESS NOTES
Discharged to home per ambulatory in stable condition with written and verbal instructions. Pt comfortable with discharge, Pt home not in active labor.

## 2021-02-08 NOTE — PROGRESS NOTES
Patient is 34 y/o   EDC 3/16/21, 34w6d. C/O vaginal bleeding this AM.  Feels daily morning cramping over last two weeks, this AM not as strong.   Contractions on monitor q 2 minutes, she is comfortable  FHT's 140, reactive  Felt increase LBP, pelvic pr

## 2021-02-08 NOTE — PROGRESS NOTES
Contractions decreased after IV hydration and subcutaneous terbutaline x 2  No further bleeding  She is comfortable  Discharge, follow up in office later this week

## 2021-02-08 NOTE — PROGRESS NOTES
Contacted patient and returned page. Patient reports new onset vaginal bleeding this morning. She reports small amount but more than 2 episodes and also noted on her clothing.  She reports lower abdominal cramping but has been present for few weeks and not

## 2021-02-08 NOTE — PROGRESS NOTES
Pt is a 33year old female admitted to TRG3/TRG3-A. Patient presents with:  Vaginal Bleeding: Pt with co vaginal spotting and cramping     Pt is  34w6d intra-uterine pregnancy. History obtained, consents signed.  Oriented to room, staff, and plan

## 2021-02-10 ENCOUNTER — OFFICE VISIT (OUTPATIENT)
Dept: PHYSICAL THERAPY | Age: 31
End: 2021-02-10
Attending: NURSE PRACTITIONER
Payer: COMMERCIAL

## 2021-02-10 ENCOUNTER — ROUTINE PRENATAL (OUTPATIENT)
Dept: OBGYN CLINIC | Facility: CLINIC | Age: 31
End: 2021-02-10
Payer: COMMERCIAL

## 2021-02-10 VITALS — SYSTOLIC BLOOD PRESSURE: 138 MMHG | DIASTOLIC BLOOD PRESSURE: 80 MMHG | BODY MASS INDEX: 30 KG/M2 | WEIGHT: 191 LBS

## 2021-02-10 DIAGNOSIS — O46.90 VAGINAL BLEEDING IN PREGNANCY: ICD-10-CM

## 2021-02-10 DIAGNOSIS — Z34.83 ENCOUNTER FOR SUPERVISION OF OTHER NORMAL PREGNANCY IN THIRD TRIMESTER: Primary | ICD-10-CM

## 2021-02-10 LAB — MULTISTIX LOT#: 6038 NUMERIC

## 2021-02-10 PROCEDURE — 3075F SYST BP GE 130 - 139MM HG: CPT | Performed by: OBSTETRICS & GYNECOLOGY

## 2021-02-10 PROCEDURE — 97110 THERAPEUTIC EXERCISES: CPT

## 2021-02-10 PROCEDURE — 97112 NEUROMUSCULAR REEDUCATION: CPT

## 2021-02-10 PROCEDURE — 99212 OFFICE O/P EST SF 10 MIN: CPT | Performed by: OBSTETRICS & GYNECOLOGY

## 2021-02-10 PROCEDURE — 3079F DIAST BP 80-89 MM HG: CPT | Performed by: OBSTETRICS & GYNECOLOGY

## 2021-02-10 PROCEDURE — 81002 URINALYSIS NONAUTO W/O SCOPE: CPT | Performed by: OBSTETRICS & GYNECOLOGY

## 2021-02-10 NOTE — PROGRESS NOTES
Hospital follow up  35w1d  Pt was under obs in hospital due to vaginal bleeding on Monday morning. She was given Terbutaline and monitored for few hours. Ctx resolved. No further bleeding. Her cervix remained closed. Today, she is doing well.  No more ble

## 2021-02-11 NOTE — PROGRESS NOTES
Dx: Back pain affecting pregnancy in third trimester (O99.891,M54.9)  Pelvic pressure in pregnancy, antepartum, third trimester (O26.893,N94.9)             Insurance (Authorized # of Visits):  3/12           Authorizing Physician: Dr. Oliverio Martinez MD visit: reports a Pelvic Floor Distress Inventory of  161.4/300 limiting completion of work and home tasks.      The results of the objective tests and measures show  3/5 Transverse Abdominis strength with absent Diastasis Recti Abdominis; R on L sacral torsion du diaphragmatic breathing to allow for pelvic brace with ADLs without valsalva.      Patient exhibits an increase in Levator Ani strength from 3/5 hold to 4/5  with 10 count hold to allow for pelvic brace with ADLs.      Patient exhibits an increased in  hip Ball MFR      Pelvic Brace with ADLS Pelvic Brace with ADLS      Increase to 6 servings of fiber/ day with 70 oz of water/ day to prevent Constipation. Coordination of DB and PFM/ TA contraction      DB then Pelvic Brace 10 count for 5' Cycle:   Marcy Rodrigez ADD  B

## 2021-02-15 ENCOUNTER — APPOINTMENT (OUTPATIENT)
Dept: PHYSICAL THERAPY | Age: 31
End: 2021-02-15
Attending: NURSE PRACTITIONER
Payer: COMMERCIAL

## 2021-02-15 ENCOUNTER — TELEPHONE (OUTPATIENT)
Dept: PHYSICAL THERAPY | Age: 31
End: 2021-02-15

## 2021-02-17 ENCOUNTER — OFFICE VISIT (OUTPATIENT)
Dept: PHYSICAL THERAPY | Age: 31
End: 2021-02-17
Attending: NURSE PRACTITIONER
Payer: COMMERCIAL

## 2021-02-17 PROCEDURE — 97110 THERAPEUTIC EXERCISES: CPT

## 2021-02-17 PROCEDURE — 97112 NEUROMUSCULAR REEDUCATION: CPT

## 2021-02-17 NOTE — PROGRESS NOTES
Dx: Back pain affecting pregnancy in third trimester (O99.891,M54.9)  Pelvic pressure in pregnancy, antepartum, third trimester (O26.893,N94.9)             Insurance (Authorized # of Visits):  4/12           Authorizing Physician: Dr. Olivia Martinez MD visit: Constipation Education with Dietary modification to prevent straining with Defecation.      Functional deficits include but are not limited to abdominal pain, vaginal pain, pressure, constipation and leakage.  Signs and symptoms are consistent with diagnos understands importance of performing HEP to prevent reoccurrence of symptoms. Plan: Patient will be seen for 1-2 x/week or a total of 12 visits over a 90 day period.   Treatment will include: Neuromuscular re-education, including use of biofeedback to ai Massage avoid Accupressure point at web space, and Medial ankle. / Abdomen Avoid Accupressure point at web space, and Medial ankle. / Abdomen    Prenatal Massage avoid Accupressure point at web space, and Medial ankle. / Abdomen     HEP:  Therapeutic Exercise

## 2021-02-19 ENCOUNTER — ROUTINE PRENATAL (OUTPATIENT)
Dept: OBGYN CLINIC | Facility: CLINIC | Age: 31
End: 2021-02-19
Payer: COMMERCIAL

## 2021-02-19 VITALS — SYSTOLIC BLOOD PRESSURE: 100 MMHG | WEIGHT: 189 LBS | DIASTOLIC BLOOD PRESSURE: 60 MMHG | BODY MASS INDEX: 30 KG/M2

## 2021-02-19 DIAGNOSIS — Z34.00 SUPERVISION OF NORMAL FIRST PREGNANCY, ANTEPARTUM: Primary | ICD-10-CM

## 2021-02-19 PROCEDURE — 3074F SYST BP LT 130 MM HG: CPT | Performed by: OBSTETRICS & GYNECOLOGY

## 2021-02-19 PROCEDURE — 87653 STREP B DNA AMP PROBE: CPT | Performed by: OBSTETRICS & GYNECOLOGY

## 2021-02-19 PROCEDURE — 87081 CULTURE SCREEN ONLY: CPT | Performed by: OBSTETRICS & GYNECOLOGY

## 2021-02-19 PROCEDURE — 3078F DIAST BP <80 MM HG: CPT | Performed by: OBSTETRICS & GYNECOLOGY

## 2021-02-22 ENCOUNTER — OFFICE VISIT (OUTPATIENT)
Dept: PHYSICAL THERAPY | Age: 31
End: 2021-02-22
Attending: NURSE PRACTITIONER
Payer: COMMERCIAL

## 2021-02-22 PROBLEM — Z34.00 SUPERVISION OF NORMAL FIRST PREGNANCY, ANTEPARTUM: Status: RESOLVED | Noted: 2018-10-22 | Resolved: 2021-02-22

## 2021-02-22 PROCEDURE — 97112 NEUROMUSCULAR REEDUCATION: CPT

## 2021-02-22 PROCEDURE — 97110 THERAPEUTIC EXERCISES: CPT

## 2021-02-22 NOTE — PROGRESS NOTES
ALBERT 36w6d    Doing well, +FM  Intermittent, not regular contractions  No LOF, VB  SVE fingertip    1. FHT-present  2. PNL:  GBS collected NKDA  3. Mode of delivery:  anticipated  4. LLP: resolved   5. Immunizations: s/p TDAP  6.  Reviewed labor precautio

## 2021-02-22 NOTE — PROGRESS NOTES
Dx: Back pain affecting pregnancy in third trimester (O99.891,M54.9)  Pelvic pressure in pregnancy, antepartum, third trimester (O26.893,N94.9)             Insurance (Authorized # of Visits):  5/12           Authorizing Physician: Dr. Jake Adler  Next MD visit: Transverse Abdominis strength with absent Diastasis Recti Abdominis; R on L sacral torsion due to Pregnancy Hormonal Ligamental Laxity; Hyper Reflexive Deep Tendon Reflexes; and 4/5 Hip Abductor weakness R>L.   Performed Myofascial release of Sacrotuberous count hold to allow for pelvic brace with ADLs. MET      Patient exhibits an increased in  hip strength from 4/5 to 5/5 to allow for ambulation.     Patient reports a reduction in DONTE from 2-3x/ day to 1x/ day resulting in decrease pad use from 1/ day.    Lunge  Ext  Seated Pir Ball MFR  Seated Spinal Twist Yoga 10 DB 2 sets:  Flat Back HS Cat/ Camel  ADD Lunge  Ext  Seated Pir Ball MFR 2.5' B  Seated Spinal Twist    Pelvic Brace with ADLS Pelvic Brace with ADLS      Increase to 6 servings of fiber/ day wit

## 2021-02-24 ENCOUNTER — OFFICE VISIT (OUTPATIENT)
Dept: PHYSICAL THERAPY | Age: 31
End: 2021-02-24
Attending: NURSE PRACTITIONER
Payer: COMMERCIAL

## 2021-02-24 PROCEDURE — 97112 NEUROMUSCULAR REEDUCATION: CPT

## 2021-02-24 PROCEDURE — 97110 THERAPEUTIC EXERCISES: CPT

## 2021-02-24 NOTE — PATIENT INSTRUCTIONS
THE PELVIC BRACE    The pelvic brace is a combined pelvic floor and transverse abdominal low intensity muscle contraction.  The transverse abdominal muscle is the deepest layer of the abdominal muscles and it aids in supporting the pelvic organs, spine the low level contraction under your fingertips. • Contract the pelvic floor creating tension in the surface layer muscles that moves up to you abdomen and stops at the bikini line. This draws in and flattens the lower and side muscles of your abdomen.   I ______________________________________________________________________      © 2004, [employer]  44 inches SI Belt                ABOUT CONSTIPATION    WHAT IS CONSTIPATION?   Constipation is defined as infrequent (fewer than three) bowel m of constipation or have recently become constipated discuss this with your physician. HOW DOES CONSTIPATION AFFECT THE BLADDER? Constipation is another possible cause of bladder control problems.   When the rectum is full of stool, it may distur relaxation of your pelvic floor muscles while emptying your bowels. Be sure to take time to empty your bowels. Remember the word “rest” in restroom. Exercising on a regular basis is also helpful to stimulate a sluggish bowel.     This recipe is commonly sug vegetables. • Choose bran and whole grain breads/cereals daily. • An increase in fiber should be accompanied with an increase in water. • Eat less processed foods and more fresh foods.    A good source of dietary fiber contains at least 2 grams per se Banana  1 medium 2.19   Blackberries 1 cup 7.20   Boysenberries 1 cup 7.20   Grapefruit 1 medium 3.61   Grapes 1 cup 1.12   Nectarine 1 medium 2.2   Orange 1 medium 3.14   Pear (with peel) 1 medium 4.32   Prunes 3 3.5   Raspberries 1 cup 7.50   Strawberr

## 2021-02-24 NOTE — PROGRESS NOTES
Discharge Summary  Dx: Back pain affecting pregnancy in third trimester (O99.891,M54.9)  Pelvic pressure in pregnancy, antepartum, third trimester (O26.893,N94.9)             Insurance (Authorized # of Visits):  6/12           Authorizing Physician: Dr. Mina Shanks straining. MET     Patient instructed on diaphragmatic breathing for parasympathetic nervous stimulation and to allow for increased intra abdominal pressure with defecation. MET     Patient instructed on Levator Ani/ Transverse Abdominis (Pelvic Brace) contr Camel  ADD Lunge  Ext  Seated Pir Ball MFR  Seated Spinal Twist Yoga 10 DB 2 sets:  Flat Back HS Cat/ Camel  ADD Lunge  Ext  Seated Pir Ball MFR 2.5' B  Seated Spinal Twist Yoga 10 DB 2 sets:  Flat Back HS Cat/ Camel  ADD Lunge   Pelvic Brace with ADLS Pel

## 2021-02-26 ENCOUNTER — ROUTINE PRENATAL (OUTPATIENT)
Dept: OBGYN CLINIC | Facility: CLINIC | Age: 31
End: 2021-02-26
Payer: COMMERCIAL

## 2021-02-26 VITALS — BODY MASS INDEX: 30 KG/M2 | WEIGHT: 191.19 LBS | DIASTOLIC BLOOD PRESSURE: 70 MMHG | SYSTOLIC BLOOD PRESSURE: 106 MMHG

## 2021-02-26 DIAGNOSIS — Z34.81 ENCOUNTER FOR SUPERVISION OF OTHER NORMAL PREGNANCY IN FIRST TRIMESTER: Primary | ICD-10-CM

## 2021-02-26 LAB — MULTISTIX LOT#: 8027 NUMERIC

## 2021-02-26 PROCEDURE — 3074F SYST BP LT 130 MM HG: CPT | Performed by: OBSTETRICS & GYNECOLOGY

## 2021-02-26 PROCEDURE — 3078F DIAST BP <80 MM HG: CPT | Performed by: OBSTETRICS & GYNECOLOGY

## 2021-02-26 PROCEDURE — 81002 URINALYSIS NONAUTO W/O SCOPE: CPT | Performed by: OBSTETRICS & GYNECOLOGY

## 2021-02-26 NOTE — PROGRESS NOTES
ALBERT - 37w3d    Doing well, +FM  Denies LOF/VB  Mild ctx, not frequent   /70   Wt 191 lb 3.2 oz (86.7 kg)   LMP 06/09/2020 (Exact Date)   BMI 29.95 kg/m²    GBS neg  RTC 1 week

## 2021-03-01 ENCOUNTER — APPOINTMENT (OUTPATIENT)
Dept: PHYSICAL THERAPY | Age: 31
End: 2021-03-01
Attending: NURSE PRACTITIONER
Payer: COMMERCIAL

## 2021-03-04 ENCOUNTER — ROUTINE PRENATAL (OUTPATIENT)
Dept: OBGYN CLINIC | Facility: CLINIC | Age: 31
End: 2021-03-04
Payer: COMMERCIAL

## 2021-03-04 VITALS — BODY MASS INDEX: 30 KG/M2 | WEIGHT: 193 LBS | SYSTOLIC BLOOD PRESSURE: 120 MMHG | DIASTOLIC BLOOD PRESSURE: 82 MMHG

## 2021-03-04 DIAGNOSIS — O36.8190 DECREASED FETAL MOVEMENT AFFECTING MANAGEMENT OF PREGNANCY, ANTEPARTUM, SINGLE OR UNSPECIFIED FETUS: ICD-10-CM

## 2021-03-04 DIAGNOSIS — Z34.81 ENCOUNTER FOR SUPERVISION OF OTHER NORMAL PREGNANCY IN FIRST TRIMESTER: Primary | ICD-10-CM

## 2021-03-04 LAB — MULTISTIX LOT#: 6038 NUMERIC

## 2021-03-04 PROCEDURE — 3079F DIAST BP 80-89 MM HG: CPT | Performed by: NURSE PRACTITIONER

## 2021-03-04 PROCEDURE — 81002 URINALYSIS NONAUTO W/O SCOPE: CPT | Performed by: NURSE PRACTITIONER

## 2021-03-04 PROCEDURE — 3074F SYST BP LT 130 MM HG: CPT | Performed by: NURSE PRACTITIONER

## 2021-03-04 PROCEDURE — 59025 FETAL NON-STRESS TEST: CPT | Performed by: NURSE PRACTITIONER

## 2021-03-04 NOTE — PROGRESS NOTES
ALBERT  Doing well, +FM but less than usual  Denies VB/LOF/uctx  Mode of delivery:  anticipated  Labor precautions discussed  RTC 1 week  NST reactive

## 2021-03-12 ENCOUNTER — ROUTINE PRENATAL (OUTPATIENT)
Dept: OBGYN CLINIC | Facility: CLINIC | Age: 31
End: 2021-03-12
Payer: COMMERCIAL

## 2021-03-12 VITALS
SYSTOLIC BLOOD PRESSURE: 118 MMHG | BODY MASS INDEX: 30.08 KG/M2 | DIASTOLIC BLOOD PRESSURE: 74 MMHG | WEIGHT: 191.63 LBS | HEIGHT: 67 IN

## 2021-03-12 DIAGNOSIS — Z34.00 SUPERVISION OF NORMAL FIRST PREGNANCY, ANTEPARTUM: Primary | ICD-10-CM

## 2021-03-12 DIAGNOSIS — Z34.90 NORMAL INTRAUTERINE PREGNANCY, ANTEPARTUM: ICD-10-CM

## 2021-03-12 LAB — MULTISTIX LOT#: 6038 NUMERIC

## 2021-03-12 PROCEDURE — 81002 URINALYSIS NONAUTO W/O SCOPE: CPT | Performed by: OBSTETRICS & GYNECOLOGY

## 2021-03-12 PROCEDURE — 3078F DIAST BP <80 MM HG: CPT | Performed by: OBSTETRICS & GYNECOLOGY

## 2021-03-12 PROCEDURE — 3074F SYST BP LT 130 MM HG: CPT | Performed by: OBSTETRICS & GYNECOLOGY

## 2021-03-12 PROCEDURE — 3008F BODY MASS INDEX DOCD: CPT | Performed by: OBSTETRICS & GYNECOLOGY

## 2021-03-12 NOTE — PROGRESS NOTES
ALBERT - 39w3d    Doing well, +FM   Denies LOF/VB/uctx  /74   Ht 67\"   Wt 191 lb 9.6 oz (86.9 kg)   LMP 06/09/2020 (Exact Date)   BMI 30.01 kg/m²    GBS neg  RTC on Wednesday   tentative IOL on Thursday scheduled

## 2021-03-13 DIAGNOSIS — Z34.90 PREGNANCY: Primary | ICD-10-CM

## 2021-03-16 ENCOUNTER — TELEPHONE (OUTPATIENT)
Dept: OBGYN UNIT | Facility: HOSPITAL | Age: 31
End: 2021-03-16

## 2021-03-16 ENCOUNTER — LAB ENCOUNTER (OUTPATIENT)
Dept: LAB | Age: 31
End: 2021-03-16
Attending: OBSTETRICS & GYNECOLOGY
Payer: COMMERCIAL

## 2021-03-16 DIAGNOSIS — Z34.90 PREGNANCY: ICD-10-CM

## 2021-03-17 ENCOUNTER — ROUTINE PRENATAL (OUTPATIENT)
Dept: OBGYN CLINIC | Facility: CLINIC | Age: 31
End: 2021-03-17
Payer: COMMERCIAL

## 2021-03-17 VITALS — DIASTOLIC BLOOD PRESSURE: 78 MMHG | BODY MASS INDEX: 30 KG/M2 | WEIGHT: 193.81 LBS | SYSTOLIC BLOOD PRESSURE: 112 MMHG

## 2021-03-17 DIAGNOSIS — Z34.83 ENCOUNTER FOR SUPERVISION OF OTHER NORMAL PREGNANCY IN THIRD TRIMESTER: Primary | ICD-10-CM

## 2021-03-17 LAB
MULTISTIX LOT#: 6038 NUMERIC
SARS-COV-2 RNA RESP QL NAA+PROBE: NOT DETECTED

## 2021-03-17 PROCEDURE — 3078F DIAST BP <80 MM HG: CPT | Performed by: OBSTETRICS & GYNECOLOGY

## 2021-03-17 PROCEDURE — 81002 URINALYSIS NONAUTO W/O SCOPE: CPT | Performed by: OBSTETRICS & GYNECOLOGY

## 2021-03-17 PROCEDURE — 3074F SYST BP LT 130 MM HG: CPT | Performed by: OBSTETRICS & GYNECOLOGY

## 2021-03-17 NOTE — PROGRESS NOTES
ALBERT - 40w1d    Doing well, +FM  Denies LOF/VB  Some ctx over the weekend, resolved after shower.   /78   Wt 193 lb 12.8 oz (87.9 kg)   LMP 06/09/2020 (Exact Date)   BMI 30.35 kg/m²    GBS neg  SVE unchanged from last visit   IOL tomorrow

## 2021-03-18 ENCOUNTER — ANESTHESIA (OUTPATIENT)
Dept: OBGYN UNIT | Facility: HOSPITAL | Age: 31
End: 2021-03-18
Payer: COMMERCIAL

## 2021-03-18 ENCOUNTER — ANESTHESIA EVENT (OUTPATIENT)
Dept: OBGYN UNIT | Facility: HOSPITAL | Age: 31
End: 2021-03-18
Payer: COMMERCIAL

## 2021-03-18 ENCOUNTER — APPOINTMENT (OUTPATIENT)
Dept: OBGYN CLINIC | Facility: HOSPITAL | Age: 31
End: 2021-03-18
Payer: COMMERCIAL

## 2021-03-18 ENCOUNTER — HOSPITAL ENCOUNTER (INPATIENT)
Facility: HOSPITAL | Age: 31
LOS: 2 days | Discharge: HOME | End: 2021-03-20
Attending: OBSTETRICS & GYNECOLOGY | Admitting: OBSTETRICS & GYNECOLOGY
Payer: COMMERCIAL

## 2021-03-18 PROBLEM — Z34.90 PREGNANCY: Status: ACTIVE | Noted: 2021-03-18

## 2021-03-18 LAB
ANTIBODY SCREEN: NEGATIVE
BASOPHILS # BLD AUTO: 0.03 X10(3) UL (ref 0–0.2)
BASOPHILS NFR BLD AUTO: 0.2 %
DEPRECATED RDW RBC AUTO: 41.6 FL (ref 35.1–46.3)
EOSINOPHIL # BLD AUTO: 0.08 X10(3) UL (ref 0–0.7)
EOSINOPHIL NFR BLD AUTO: 0.6 %
ERYTHROCYTE [DISTWIDTH] IN BLOOD BY AUTOMATED COUNT: 14.3 % (ref 11–15)
HCT VFR BLD AUTO: 37.7 %
HGB BLD-MCNC: 12.9 G/DL
IMM GRANULOCYTES # BLD AUTO: 0.09 X10(3) UL (ref 0–1)
IMM GRANULOCYTES NFR BLD: 0.6 %
LYMPHOCYTES # BLD AUTO: 2.27 X10(3) UL (ref 1–4)
LYMPHOCYTES NFR BLD AUTO: 15.9 %
MCH RBC QN AUTO: 28.1 PG (ref 26–34)
MCHC RBC AUTO-ENTMCNC: 34.2 G/DL (ref 31–37)
MCV RBC AUTO: 82.1 FL
MONOCYTES # BLD AUTO: 0.6 X10(3) UL (ref 0.1–1)
MONOCYTES NFR BLD AUTO: 4.2 %
NEUTROPHILS # BLD AUTO: 11.19 X10 (3) UL (ref 1.5–7.7)
NEUTROPHILS # BLD AUTO: 11.19 X10(3) UL (ref 1.5–7.7)
NEUTROPHILS NFR BLD AUTO: 78.5 %
PLATELET # BLD AUTO: 168 10(3)UL (ref 150–450)
RBC # BLD AUTO: 4.59 X10(6)UL
RH BLOOD TYPE: POSITIVE
T PALLIDUM AB SER QL IA: NONREACTIVE
WBC # BLD AUTO: 14.3 X10(3) UL (ref 4–11)

## 2021-03-18 PROCEDURE — 0KQM0ZZ REPAIR PERINEUM MUSCLE, OPEN APPROACH: ICD-10-PCS | Performed by: OBSTETRICS & GYNECOLOGY

## 2021-03-18 PROCEDURE — 3E0D7GC INTRODUCTION OF OTHER THERAPEUTIC SUBSTANCE INTO MOUTH AND PHARYNX, VIA NATURAL OR ARTIFICIAL OPENING: ICD-10-PCS | Performed by: OBSTETRICS & GYNECOLOGY

## 2021-03-18 PROCEDURE — 59400 OBSTETRICAL CARE: CPT | Performed by: OBSTETRICS & GYNECOLOGY

## 2021-03-18 RX ORDER — IBUPROFEN 600 MG/1
600 TABLET ORAL EVERY 6 HOURS
Status: DISCONTINUED | OUTPATIENT
Start: 2021-03-18 | End: 2021-03-20

## 2021-03-18 RX ORDER — LIDOCAINE HYDROCHLORIDE AND EPINEPHRINE 15; 5 MG/ML; UG/ML
INJECTION, SOLUTION EPIDURAL AS NEEDED
Status: DISCONTINUED | OUTPATIENT
Start: 2021-03-18 | End: 2021-03-18 | Stop reason: SURG

## 2021-03-18 RX ORDER — ACETAMINOPHEN 325 MG/1
650 TABLET ORAL EVERY 6 HOURS PRN
Status: DISCONTINUED | OUTPATIENT
Start: 2021-03-18 | End: 2021-03-20

## 2021-03-18 RX ORDER — TERBUTALINE SULFATE 1 MG/ML
0.25 INJECTION, SOLUTION SUBCUTANEOUS AS NEEDED
Status: DISCONTINUED | OUTPATIENT
Start: 2021-03-18 | End: 2021-03-18 | Stop reason: HOSPADM

## 2021-03-18 RX ORDER — NALBUPHINE HCL 10 MG/ML
2.5 AMPUL (ML) INJECTION
Status: DISCONTINUED | OUTPATIENT
Start: 2021-03-18 | End: 2021-03-18

## 2021-03-18 RX ORDER — SODIUM CHLORIDE, SODIUM LACTATE, POTASSIUM CHLORIDE, CALCIUM CHLORIDE 600; 310; 30; 20 MG/100ML; MG/100ML; MG/100ML; MG/100ML
INJECTION, SOLUTION INTRAVENOUS CONTINUOUS
Status: DISCONTINUED | OUTPATIENT
Start: 2021-03-18 | End: 2021-03-18 | Stop reason: HOSPADM

## 2021-03-18 RX ORDER — DEXTROSE, SODIUM CHLORIDE, SODIUM LACTATE, POTASSIUM CHLORIDE, AND CALCIUM CHLORIDE 5; .6; .31; .03; .02 G/100ML; G/100ML; G/100ML; G/100ML; G/100ML
INJECTION, SOLUTION INTRAVENOUS AS NEEDED
Status: DISCONTINUED | OUTPATIENT
Start: 2021-03-18 | End: 2021-03-18 | Stop reason: HOSPADM

## 2021-03-18 RX ORDER — BISACODYL 10 MG
10 SUPPOSITORY, RECTAL RECTAL ONCE AS NEEDED
Status: DISCONTINUED | OUTPATIENT
Start: 2021-03-18 | End: 2021-03-20

## 2021-03-18 RX ORDER — SIMETHICONE 80 MG
80 TABLET,CHEWABLE ORAL 3 TIMES DAILY PRN
Status: DISCONTINUED | OUTPATIENT
Start: 2021-03-18 | End: 2021-03-20

## 2021-03-18 RX ORDER — ONDANSETRON 2 MG/ML
4 INJECTION INTRAMUSCULAR; INTRAVENOUS EVERY 6 HOURS PRN
Status: DISCONTINUED | OUTPATIENT
Start: 2021-03-18 | End: 2021-03-18 | Stop reason: HOSPADM

## 2021-03-18 RX ORDER — TRISODIUM CITRATE DIHYDRATE AND CITRIC ACID MONOHYDRATE 500; 334 MG/5ML; MG/5ML
30 SOLUTION ORAL AS NEEDED
Status: DISCONTINUED | OUTPATIENT
Start: 2021-03-18 | End: 2021-03-18 | Stop reason: HOSPADM

## 2021-03-18 RX ORDER — ACETAMINOPHEN 500 MG
500 TABLET ORAL EVERY 6 HOURS PRN
Status: DISCONTINUED | OUTPATIENT
Start: 2021-03-18 | End: 2021-03-18 | Stop reason: HOSPADM

## 2021-03-18 RX ORDER — AMMONIA INHALANTS 0.04 G/.3ML
0.3 INHALANT RESPIRATORY (INHALATION) AS NEEDED
Status: DISCONTINUED | OUTPATIENT
Start: 2021-03-18 | End: 2021-03-18 | Stop reason: HOSPADM

## 2021-03-18 RX ORDER — DOCUSATE SODIUM 100 MG/1
100 CAPSULE, LIQUID FILLED ORAL
Status: DISCONTINUED | OUTPATIENT
Start: 2021-03-18 | End: 2021-03-20

## 2021-03-18 RX ORDER — BUPIVACAINE HCL/0.9 % NACL/PF 0.25 %
5 PLASTIC BAG, INJECTION (ML) EPIDURAL AS NEEDED
Status: DISCONTINUED | OUTPATIENT
Start: 2021-03-18 | End: 2021-03-18

## 2021-03-18 RX ORDER — IBUPROFEN 600 MG/1
600 TABLET ORAL EVERY 6 HOURS PRN
Status: DISCONTINUED | OUTPATIENT
Start: 2021-03-18 | End: 2021-03-18 | Stop reason: HOSPADM

## 2021-03-18 RX ADMIN — LIDOCAINE HYDROCHLORIDE AND EPINEPHRINE 5 ML: 15; 5 INJECTION, SOLUTION EPIDURAL at 16:42:00

## 2021-03-18 NOTE — PROGRESS NOTES
Pt is a 33year old female admitted to 110/110-A. Patient presents with:  Scheduled Induction: post dates     Pt is  40w2d intra-uterine pregnancy. History obtained, consents signed. Oriented to room, staff, and plan of care.

## 2021-03-18 NOTE — ANESTHESIA PROCEDURE NOTES
Labor Analgesia  Performed by: Harrison Stanley MD  Authorized by: Harrison Stanley MD       General Information and Staff    Start Time:  3/18/2021 4:35 PM  End Time:  3/18/2021 4:42 PM  Anesthesiologist:  Harrison Stanley MD  Performed by:   Anesthesiologist  P

## 2021-03-18 NOTE — ANESTHESIA PREPROCEDURE EVALUATION
PRE-OP EVALUATION    Patient Name: Sly George    Pre-op Diagnosis: * No surgery found *    * No surgery found *    * Surgery not found *    Pre-op vitals reviewed.   Temp: 97.3 °F (36.3 °C)  Pulse: 85  BP: 128/86  SpO2: 100 %  Body mass index is 3 , 3/17/2021 at Unknown time  Pyridoxine HCl (B-6) 100 MG Oral Tab, Take by mouth., Disp: , Rfl:         Allergies: Patient has no known allergies. Anesthesia Evaluation    Patient summary reviewed.     Anesthetic Complications           GI/Hepatic/Megha

## 2021-03-18 NOTE — H&P
[facility]  Obstetrics and Gynecology  History & Physical    [address] [facility] Patient Status:  Inpatient    1990 MRN [id number]   Location 18[address] [facility] Attending Satya Sung [facility] Day 0 PCP Chan Porter, index is 30.35 kg/m².     NAD  Abdomen: FHT present, gravid   Extremities: negative edema bilaterally, negative calf tenderness bilaterally, Chantal's sign negative bilaterally     FHT: moderate variability/145 BPM / Positive accelerations/Negative decelerati

## 2021-03-19 LAB
BASOPHILS # BLD AUTO: 0.02 X10(3) UL (ref 0–0.2)
BASOPHILS NFR BLD AUTO: 0.1 %
DEPRECATED RDW RBC AUTO: 42 FL (ref 35.1–46.3)
EOSINOPHIL # BLD AUTO: 0.04 X10(3) UL (ref 0–0.7)
EOSINOPHIL NFR BLD AUTO: 0.2 %
ERYTHROCYTE [DISTWIDTH] IN BLOOD BY AUTOMATED COUNT: 14.2 % (ref 11–15)
HCT VFR BLD AUTO: 31.6 %
HGB BLD-MCNC: 10.9 G/DL
IMM GRANULOCYTES # BLD AUTO: 0.1 X10(3) UL (ref 0–1)
IMM GRANULOCYTES NFR BLD: 0.6 %
LYMPHOCYTES # BLD AUTO: 2.42 X10(3) UL (ref 1–4)
LYMPHOCYTES NFR BLD AUTO: 15 %
MCH RBC QN AUTO: 28.5 PG (ref 26–34)
MCHC RBC AUTO-ENTMCNC: 34.5 G/DL (ref 31–37)
MCV RBC AUTO: 82.5 FL
MONOCYTES # BLD AUTO: 0.88 X10(3) UL (ref 0.1–1)
MONOCYTES NFR BLD AUTO: 5.5 %
NEUTROPHILS # BLD AUTO: 12.64 X10 (3) UL (ref 1.5–7.7)
NEUTROPHILS # BLD AUTO: 12.64 X10(3) UL (ref 1.5–7.7)
NEUTROPHILS NFR BLD AUTO: 78.6 %
PLATELET # BLD AUTO: 143 10(3)UL (ref 150–450)
RBC # BLD AUTO: 3.83 X10(6)UL
WBC # BLD AUTO: 16.1 X10(3) UL (ref 4–11)

## 2021-03-19 NOTE — PROGRESS NOTES
Labor Analgesia Follow Up Note    Patient underwent epidural anesthesia for labor analgesia,    Placenta Date/Time: 3/18/2021  8:23 PM    Delivery Date/Time[de-identified] 3/18/2021  8:21 PM    /89 (BP Location: Right arm)   Pulse 79   Temp 97.7 °F (36.5 °C) (Ora

## 2021-03-19 NOTE — L&D DELIVERY NOTE
Ced Walters, Girl [[id number]]    Labor Events     labor?: No   steroids?: None  Antibiotics received during labor?: No  Antibiotics (enter # doses in comment): none  Rupture date/time: 3/18/2021 1544     Rupture type: AROM  Fluid color: Clear Respiratory effort Absent Weak cry; hypoventilation Good, crying              1 Minute:  5 Minute:  10 Minute:  15 Minute:  20 Minute:    Skin color:        Heart rate:        Reflex irritablity:        Muscle tone:        Respiratory effort:         Tota repaired with 3-0 vicryl. Bleeding was minimal.  The patient was then moved to the supine position in stable condition. Sponge and instrument counts were correct. Complications:  None    Mother and infant in good condition.     MD KARY Marks - Carol Robert

## 2021-03-19 NOTE — PROGRESS NOTES
NURSING ADMISSION NOTE      Patient admitted via Wheelchair with baby in arms. [facility] ID bands verified, [employer] security bracelets in place. Oriented to room. Safety precautions initiated. Bed in low position.   Call

## 2021-03-19 NOTE — PROGRESS NOTES
Patient up to bathroom with assist x 1. Unable to void at this time. Patient transferred to mother/baby room 1115 per wheelchair in stable condition with baby and personal belongings. Accompanied by significant other and staff.   Report given to mother/bab

## 2021-03-20 VITALS
HEART RATE: 69 BPM | HEIGHT: 67 IN | RESPIRATION RATE: 18 BRPM | BODY MASS INDEX: 30.42 KG/M2 | SYSTOLIC BLOOD PRESSURE: 125 MMHG | DIASTOLIC BLOOD PRESSURE: 79 MMHG | OXYGEN SATURATION: 100 % | TEMPERATURE: 98 F | WEIGHT: 193.81 LBS

## 2021-03-20 NOTE — DISCHARGE SUMMARY
[facility] Patient Status:  inpatient    1990 MRN [id number]   Location 1115/1115-A Attending EMG [address] 2 PCP JORDEN Humphries     VAGINAL DELIVERY DISCHARGE SUMMARY     Admit date: 3/18/2021    Discharge date: 3/20/2021

## 2021-03-20 NOTE — PROGRESS NOTES
VAGINAL DELIVERY POSTPARTUM DAY 2    Subjective:   Patient without complaints. Bleeding normal.  Ambulating without difficulty. Urinating without difficulty.     Objective:     Patient Vitals for the past 24 hrs:   BP Temp Temp src Pulse Resp   03/19/21 1

## 2021-03-22 ENCOUNTER — TELEPHONE (OUTPATIENT)
Dept: OBGYN UNIT | Facility: HOSPITAL | Age: 31
End: 2021-03-22

## 2021-04-30 ENCOUNTER — POSTPARTUM (OUTPATIENT)
Dept: OBGYN CLINIC | Facility: CLINIC | Age: 31
End: 2021-04-30
Payer: COMMERCIAL

## 2021-04-30 VITALS
WEIGHT: 168 LBS | SYSTOLIC BLOOD PRESSURE: 112 MMHG | HEIGHT: 67.75 IN | BODY MASS INDEX: 25.76 KG/M2 | DIASTOLIC BLOOD PRESSURE: 78 MMHG

## 2021-04-30 DIAGNOSIS — R52 POSTPARTUM PAIN: ICD-10-CM

## 2021-04-30 PROBLEM — Z34.90 NORMAL PREGNANCY: Status: RESOLVED | Noted: 2021-02-08 | Resolved: 2021-04-30

## 2021-04-30 PROBLEM — Z34.90 PREGNANCY: Status: RESOLVED | Noted: 2021-03-18 | Resolved: 2021-04-30

## 2021-04-30 PROBLEM — O44.42 LOW-LYING PLACENTA IN SECOND TRIMESTER: Status: RESOLVED | Noted: 2020-11-24 | Resolved: 2021-04-30

## 2021-04-30 PROBLEM — O99.891 BACK PAIN AFFECTING PREGNANCY IN THIRD TRIMESTER: Status: RESOLVED | Noted: 2021-01-26 | Resolved: 2021-04-30

## 2021-04-30 PROBLEM — M54.9 BACK PAIN AFFECTING PREGNANCY IN THIRD TRIMESTER: Status: RESOLVED | Noted: 2021-01-26 | Resolved: 2021-04-30

## 2021-04-30 PROCEDURE — 3008F BODY MASS INDEX DOCD: CPT | Performed by: OBSTETRICS & GYNECOLOGY

## 2021-04-30 PROCEDURE — 3074F SYST BP LT 130 MM HG: CPT | Performed by: OBSTETRICS & GYNECOLOGY

## 2021-04-30 PROCEDURE — 3078F DIAST BP <80 MM HG: CPT | Performed by: OBSTETRICS & GYNECOLOGY

## 2021-04-30 NOTE — PROGRESS NOTES
[facility]  Obstetrics and Gynecology   Postpartum Progress Note    Subjective:     Salma Morris is a 33year old  female who is s/p . Baby was named Bernardino. She is doing very well. Currently breast-feeding exclusively. Richmond Sanders Read contraception  - Birth spacing was discussed. - pt reports will use condoms  All of the findings and plan were discussed with the patient. She notes understanding and agrees with the plan of care.   All questions were answered to the best of my ability a

## 2021-06-23 ENCOUNTER — TELEPHONE (OUTPATIENT)
Dept: PHYSICAL THERAPY | Facility: HOSPITAL | Age: 31
End: 2021-06-23

## 2021-06-28 ENCOUNTER — OFFICE VISIT (OUTPATIENT)
Dept: PHYSICAL THERAPY | Age: 31
End: 2021-06-28
Attending: OBSTETRICS & GYNECOLOGY
Payer: COMMERCIAL

## 2021-06-28 DIAGNOSIS — R52 POSTPARTUM PAIN: ICD-10-CM

## 2021-06-28 PROCEDURE — 97163 PT EVAL HIGH COMPLEX 45 MIN: CPT

## 2021-06-28 PROCEDURE — 97112 NEUROMUSCULAR REEDUCATION: CPT

## 2021-06-28 PROCEDURE — 97535 SELF CARE MNGMENT TRAINING: CPT

## 2021-06-28 NOTE — PROGRESS NOTES
MUSCULOSKELETAL AND PELVIC FLOOR EVALUATION:     Referring Physician: Dr. Horace Cristobal  Diagnosis: Postpartum pain (B87.30,Z94)     Date of Service: 6/28/2021     PATIENT SUMMARY   Iggy Fletcher is a 33year old y/o female who presents to therapy today w with defecation: No   Laxative use: No  Drinking 80+ oz of water/day and eating 3-5 servings of fiber/day.     SEXUAL HEALTH STATUS  Marinoff Scale: 0  History of Sexual Abuse: N/A  Sexual [place] Status: Active  Pain with initial and/or deep penetratio strength;  No LORIN or visceral restriction; Slight lack of Hamstring flexibility    Informed verbal consent for internal pelvic evaluation given: Yes    External Observation:   Voluntary contraction: present   Voluntary relaxation: present  Involuntary contr pain  PLAN OF CARE:    Goals: (to be met in 12 visits)    STG 4-6 visits  Patient instructed on bladder/ bowel diary, and fluid/ food intake diary for 3 days.      Patient instructed on bladder irritants, increased water intake to 90 ounces /day with Breast mobilizations to restore normal joint mechanics, improve pelvic floor muscle and tissue mobility, and decrease pain;  Therapeutic exercises including ROM, strengthening; lumbo pelvic strengthening and stabilization training, stretching program; Pt. educatio

## 2021-06-28 NOTE — PATIENT INSTRUCTIONS
COORDINATING THE PELVIC FLOOR AND BREATHING DIAPHRAGM      Learning to coordinate and contract the pelvic floor with exhalation is a practical technique for bladder control.  It is useful to contract the pelvic floor during exhalation which occurs during brace correctly, and then use the brace with physical activities that put excessive pressure on the pelvic organs and pelvic floor muscles. POSTURAL MUSCLES  Many muscles in our body function all day long to keep us upright against gravity.  We rarely n ACTIVITIES  Try the following daily activities in lying, sitting and standing positions. The pelvic brace and cough  • Breathe in, as you prepare to cough, bring your hand to your mouth and do the pelvic brace. • Hold the muscle and cough.   Now relax other foods for diarrhea don’t work after four to five days, it’s time to see your health care provider.     Purchase Light resistance [employer] systems 14 inch diameter

## 2021-06-30 ENCOUNTER — OFFICE VISIT (OUTPATIENT)
Dept: PHYSICAL THERAPY | Age: 31
End: 2021-06-30
Attending: OBSTETRICS & GYNECOLOGY
Payer: COMMERCIAL

## 2021-06-30 PROCEDURE — 97110 THERAPEUTIC EXERCISES: CPT

## 2021-06-30 PROCEDURE — 97112 NEUROMUSCULAR REEDUCATION: CPT

## 2021-06-30 PROCEDURE — 97535 SELF CARE MNGMENT TRAINING: CPT

## 2021-06-30 NOTE — PROGRESS NOTES
Dx:  Postpartum pain (O90.89,R52)          Insurance (Authorized # of Visits):  2/12           Authorizing Physician: Dr. Analy Mcdonald          Next MD visit: none scheduled  Fall Risk: standard         Precautions: n/a              Subjective: Pressure in Lower Ab MET last course of PT     Patient instructed on use of step stool to allow for defecation without straining. MET last course of PT     Patient instructed on diaphragmatic breathing for parasympathetic nervous stimulation and to allow for increased intra abd program; Pt. education for bladder/bowel health, neuroscience principles for pelvic floor rehab, bladder retraining, posture and body mechanics, Functional integration of pelvic floor muscle exercises, Modalities as needed (including ultrasound and e-stimu (O90.89,R52) . Pt and PT discussed evaluation findings, pathology, POC and HEP.  Pt voiced understanding and performs HEP correctly without reported pain.  Skilled Pelvic Physical Therapy is medically necessary to address the above impairments and reach fun prevent reoccurrence of symptoms.        Plan: Patient will be seen for 1-2 x/week or a total of 12 visits over a 90 day period.  Treatment will include: Neuromuscular re-education, including use of biofeedback to aid in pelvic floor muscle retraining ([place] diaphragmatic breathing and pelvic floor contraction  and knack/pelvic muscle brace; Light resistance Pilate's Ring Power systems 14 inch diameter for 5' cycle:ADD/ ABD     Manual: CTR LAQ     Charges: 1TE, 1SC, 1NM           Total Timed Treatment: 43 min

## 2021-06-30 NOTE — PATIENT INSTRUCTIONS
COORDINATING THE PELVIC FLOOR AND BREATHING DIAPHRAGM      Learning to coordinate and contract the pelvic floor with exhalation is a practical technique for bladder control.  It is useful to contract the pelvic floor during exhalation which occurs during brace correctly, and then use the brace with physical activities that put excessive pressure on the pelvic organs and pelvic floor muscles. POSTURAL MUSCLES  Many muscles in our body function all day long to keep us upright against gravity.  We rarely n [employer]  Final Thoughts on the BRAT Diet  • The BRAT diet includes bananas, rice, applesauce and toast — all bland foods that are said to be easy on the digestive system.    • BRAT diet foods may be helpful for the first 24 hours of experiencing di intensity. The transverse abdominal and pelvic floor muscles are included in this group of muscles along with muscles such as your back and neck muscles. When one of the muscles get weak it requires retraining to get it working again.      GUIDELINES TO PER your throat. • Repeat ___ times    The pelvic brace and sit to stand  • Breathe in as you prepare to stand   • Do the pelvic brace. Hold the muscles and stand up. • Be sure your therapist has shown you the proper technique. • Repeat ___ times.      The

## 2021-06-30 NOTE — PROGRESS NOTES
Dx: Postpartum pain (O90.89,R52)          Insurance (Authorized # of Visits):  2/12           Authorizing Physician: Dr. West Salmeron  Next MD visit: none scheduled  Fall Risk: standard         Precautions: n/a             Subjective: Pressure in Lower Abdomen. of PT     Patient instructed on use of step stool to allow for defecation without straining. MET last course of PT     Patient instructed on diaphragmatic breathing for parasympathetic nervous stimulation and to allow for increased intra abdominal pressure education for bladder/bowel health, neuroscience principles for pelvic floor rehab, bladder retraining, posture and body mechanics, Functional integration of pelvic floor muscle exercises, Modalities as needed (including ultrasound and e-stimulation), HEP

## 2021-07-07 ENCOUNTER — OFFICE VISIT (OUTPATIENT)
Dept: PHYSICAL THERAPY | Age: 31
End: 2021-07-07
Attending: OBSTETRICS & GYNECOLOGY
Payer: COMMERCIAL

## 2021-07-07 PROCEDURE — 97112 NEUROMUSCULAR REEDUCATION: CPT

## 2021-07-07 PROCEDURE — 97110 THERAPEUTIC EXERCISES: CPT

## 2021-07-07 PROCEDURE — 97140 MANUAL THERAPY 1/> REGIONS: CPT

## 2021-07-07 NOTE — PROGRESS NOTES
Dx:  Postpartum pain (O90.89,R52)          Insurance (Authorized # of Visits):  3/12           Authorizing Physician: Dr. Janes Mobley          Next MD visit: none scheduled  Fall Risk: standard         Precautions: n/a              Subjective: Less pressure in Low goals.)        Goals:   STG 4-6 visits  Patient instructed on bladder/ bowel diary, and fluid/ food intake diary for 3 days.  MET last course of PT     Patient instructed on bladder irritants, increased water intake to 90 ounces /day with Breast Feeding, an training, up training, isolation, and coordination); Manual therapy: soft tissue and joint mobilizations to restore normal joint mechanics, improve pelvic floor muscle and tissue mobility, and decrease pain;  Therapeutic exercises including ROM, strengtheni

## 2021-07-07 NOTE — PATIENT INSTRUCTIONS
COORDINATING THE PELVIC FLOOR AND BREATHING DIAPHRAGM      Learning to coordinate and contract the pelvic floor with exhalation is a practical technique for bladder control.  It is useful to contract the pelvic floor during exhalation which occurs during brace correctly, and then use the brace with physical activities that put excessive pressure on the pelvic organs and pelvic floor muscles. POSTURAL MUSCLES  Many muscles in our body function all day long to keep us upright against gravity.  We rarely n [employer]  Final Thoughts on the BRAT Diet  • The BRAT diet includes bananas, rice, applesauce and toast — all bland foods that are said to be easy on the digestive system.    • BRAT diet foods may be helpful for the first 24 hours of experiencing di intensity. The transverse abdominal and pelvic floor muscles are included in this group of muscles along with muscles such as your back and neck muscles. When one of the muscles get weak it requires retraining to get it working again.      GUIDELINES TO PER your throat. • Repeat ___ times    The pelvic brace and sit to stand with ball between knees  • Breathe in as you prepare to stand   • Do the pelvic brace. Hold the muscles and stand up. • Be sure your therapist has shown you the proper technique.   • R completely each time you pass urine. Do not rush the process. • Consistently ignoring the urge to go (waiting more than 4 hours between toileting) or urinating too infrequently may be convenient but not healthy for your bladder.   • Avoid going to the [facility] are thought to contribute to bladder leakage and irritability. However their effect on the bladder is not completely understood and you may want to see if eliminating one or all of these items improves your bladder control.   If you are unable to give them

## 2021-07-14 ENCOUNTER — OFFICE VISIT (OUTPATIENT)
Dept: PHYSICAL THERAPY | Age: 31
End: 2021-07-14
Attending: OBSTETRICS & GYNECOLOGY
Payer: COMMERCIAL

## 2021-07-14 PROCEDURE — 97112 NEUROMUSCULAR REEDUCATION: CPT

## 2021-07-14 PROCEDURE — 97110 THERAPEUTIC EXERCISES: CPT

## 2021-07-14 NOTE — PATIENT INSTRUCTIONS
COORDINATING THE PELVIC FLOOR AND BREATHING DIAPHRAGM      Learning to coordinate and contract the pelvic floor with exhalation is a practical technique for bladder control.  It is useful to contract the pelvic floor during exhalation which occurs during brace correctly, and then use the brace with physical activities that put excessive pressure on the pelvic organs and pelvic floor muscles. POSTURAL MUSCLES  Many muscles in our body function all day long to keep us upright against gravity.  We rarely n [employer]  Final Thoughts on the BRAT Diet  • The BRAT diet includes bananas, rice, applesauce and toast — all bland foods that are said to be easy on the digestive system.    • BRAT diet foods may be helpful for the first 24 hours of experiencing di intensity. The transverse abdominal and pelvic floor muscles are included in this group of muscles along with muscles such as your back and neck muscles. When one of the muscles get weak it requires retraining to get it working again.      GUIDELINES TO PER your throat. • Repeat ___ times    The pelvic brace and sit to stand with ball between knees  • Breathe in as you prepare to stand   • Do the pelvic brace. Hold the muscles and stand up. • Be sure your therapist has shown you the proper technique.   • R completely each time you pass urine. Do not rush the process. • Consistently ignoring the urge to go (waiting more than 4 hours between toileting) or urinating too infrequently may be convenient but not healthy for your bladder.   • Avoid going to the [facility] are thought to contribute to bladder leakage and irritability. However their effect on the bladder is not completely understood and you may want to see if eliminating one or all of these items improves your bladder control.   If you are unable to give them

## 2021-07-14 NOTE — PROGRESS NOTES
Dx:  Postpartum pain (O90.89,R52)          Insurance (Authorized # of Visits):  4/12           Authorizing Physician: Dr. Frances Jimenez          Next MD visit: none scheduled  Fall Risk: standard         Precautions: n/a              Subjective: Less DONTE with sit  t diary for 3 days. MET last course of PT     Patient instructed on bladder irritants, increased water intake to 90 ounces /day with Breast Feeding, and increased fiber intake to 25g/ day for bowel/ bladder health.  MET last course of PT     Patient instructe restore normal joint mechanics, improve pelvic floor muscle and tissue mobility, and decrease pain;  Therapeutic exercises including ROM, strengthening; lumbo pelvic strengthening and stabilization training, stretching program; Pt. education for bladder/bow

## 2021-07-21 ENCOUNTER — OFFICE VISIT (OUTPATIENT)
Dept: PHYSICAL THERAPY | Age: 31
End: 2021-07-21
Attending: OBSTETRICS & GYNECOLOGY
Payer: COMMERCIAL

## 2021-07-21 PROCEDURE — 97110 THERAPEUTIC EXERCISES: CPT

## 2021-07-21 PROCEDURE — 97112 NEUROMUSCULAR REEDUCATION: CPT

## 2021-07-21 NOTE — PATIENT INSTRUCTIONS
COORDINATING THE PELVIC FLOOR AND BREATHING DIAPHRAGM      Learning to coordinate and contract the pelvic floor with exhalation is a practical technique for bladder control.  It is useful to contract the pelvic floor during exhalation which occurs during brace correctly, and then use the brace with physical activities that put excessive pressure on the pelvic organs and pelvic floor muscles. POSTURAL MUSCLES  Many muscles in our body function all day long to keep us upright against gravity.  We rarely n [employer]  Final Thoughts on the BRAT Diet  • The BRAT diet includes bananas, rice, applesauce and toast — all bland foods that are said to be easy on the digestive system.    • BRAT diet foods may be helpful for the first 24 hours of experiencing di intensity. The transverse abdominal and pelvic floor muscles are included in this group of muscles along with muscles such as your back and neck muscles. When one of the muscles get weak it requires retraining to get it working again.      GUIDELINES TO PER your throat. • Repeat ___ times    The pelvic brace and sit to stand with ball between knees  • Breathe in as you prepare to stand   • Do the pelvic brace. Hold the muscles and stand up. • Be sure your therapist has shown you the proper technique.   • R completely each time you pass urine. Do not rush the process. • Consistently ignoring the urge to go (waiting more than 4 hours between toileting) or urinating too infrequently may be convenient but not healthy for your bladder.   • Avoid going to the [facility] are thought to contribute to bladder leakage and irritability. However their effect on the bladder is not completely understood and you may want to see if eliminating one or all of these items improves your bladder control.   If you are unable to give them

## 2021-07-21 NOTE — PROGRESS NOTES
Dx:  Postpartum pain (O90.89,R52)          Insurance (Authorized # of Visits):  4/12           Authorizing Physician: Dr. Scott Smith          Next MD visit: none scheduled  Fall Risk: standard         Precautions: n/a              Subjective: Girls weekend in For impairments and reach functional goals.)        Goals:   STG 4-6 visits  Patient instructed on bladder/ bowel diary, and fluid/ food intake diary for 3 days.  MET last course of PT     Patient instructed on bladder irritants, increased water intake to 90 ou floor muscle retraining (down training, up training, isolation, and coordination); Manual therapy: soft tissue and joint mobilizations to restore normal joint mechanics, improve pelvic floor muscle and tissue mobility, and decrease pain;  Therapeutic exerci coordination of diaphragmatic breathing and pelvic floor contraction  and knack/pelvic muscle brace; Light resistance Pilate's Ring Power systems 14 inch diameter for 5' cycle:ADD/ ABD     Manual: CTR LAQ, Bladder Mobilization     Charges: 1TE, 2NM

## 2021-07-28 ENCOUNTER — OFFICE VISIT (OUTPATIENT)
Dept: PHYSICAL THERAPY | Age: 31
End: 2021-07-28
Attending: OBSTETRICS & GYNECOLOGY
Payer: COMMERCIAL

## 2021-07-28 PROCEDURE — 97112 NEUROMUSCULAR REEDUCATION: CPT

## 2021-07-28 PROCEDURE — 97110 THERAPEUTIC EXERCISES: CPT

## 2021-07-28 NOTE — PATIENT INSTRUCTIONS
COORDINATING THE PELVIC FLOOR AND BREATHING DIAPHRAGM      Learning to coordinate and contract the pelvic floor with exhalation is a practical technique for bladder control.  It is useful to contract the pelvic floor during exhalation which occurs during brace correctly, and then use the brace with physical activities that put excessive pressure on the pelvic organs and pelvic floor muscles. POSTURAL MUSCLES  Many muscles in our body function all day long to keep us upright against gravity.  We rarely n [employer]  Final Thoughts on the BRAT Diet  • The BRAT diet includes bananas, rice, applesauce and toast — all bland foods that are said to be easy on the digestive system.    • BRAT diet foods may be helpful for the first 24 hours of experiencing di intensity. The transverse abdominal and pelvic floor muscles are included in this group of muscles along with muscles such as your back and neck muscles. When one of the muscles get weak it requires retraining to get it working again.      GUIDELINES TO PER your throat. • Repeat ___ times    The pelvic brace and sit to stand with ball between knees  • Breathe in as you prepare to stand   • Do the pelvic brace. Hold the muscles and stand up. • Be sure your therapist has shown you the proper technique.   • R completely each time you pass urine. Do not rush the process. • Consistently ignoring the urge to go (waiting more than 4 hours between toileting) or urinating too infrequently may be convenient but not healthy for your bladder.   • Avoid going to the [facility] are thought to contribute to bladder leakage and irritability. However their effect on the bladder is not completely understood and you may want to see if eliminating one or all of these items improves your bladder control.   If you are unable to give them

## 2021-07-28 NOTE — PROGRESS NOTES
Discharge Summary  Dx: Postpartum pain (O90.89,R52)          Insurance (Authorized # of Visits):  6/12           Authorizing Physician: Dr. Teddy Martinez MD visit: none scheduled  Fall Risk: standard         Precautions: n/a              Subjective: G course of PT     Patient instructed on diaphragmatic breathing for parasympathetic nervous stimulation and to allow for increased intra abdominal pressure with defecation. MET last course of PT     Patient instructed on Levator Ani/ Transverse Abdominis (Pe Resistance Pilate's Ring for 5' cycle:  ADD  ABD  Yellow   T-band  Shuttle Leg Press #75 Pelvic Brace with light Resistance Pilate's Ring for 5' cycle:  ADD  ABD  Yellow   T-band  PERF  PFDI-20  Goals  HEP review   CTR LAQ  CTR LAQ  Bladder Mobilization St

## 2021-08-11 ENCOUNTER — APPOINTMENT (OUTPATIENT)
Dept: PHYSICAL THERAPY | Age: 31
End: 2021-08-11
Attending: OBSTETRICS & GYNECOLOGY
Payer: COMMERCIAL

## 2022-01-11 NOTE — TELEPHONE ENCOUNTER
LMP: 6/9/20  EDC based on lmp: 3/16/21    8 wks on 8/4/20; appt on 8/11 at 9 wks      Call to patient; no answer. Unable to LM.  VM box full
Left message for patient to call back.
Patient calling to initiate prenatal care  LMP 06-9-20  Patient is 7-8 weeks on AUG 3RD  Confirmation Ultrasound and Appointment scheduled on AUG 11([facility] )  Insurance BCBS PPO  Good time to return phone call ANYTIME
Patient seen in office today
No

## (undated) NOTE — LETTER
Dear new mom:    We've missed you! The nurses of [facility] have tried to reach you by phone to ask if you had any questions regarding your health or the care of your new little one.     Please feel free to call your doctor with an